# Patient Record
Sex: MALE | URBAN - METROPOLITAN AREA
[De-identification: names, ages, dates, MRNs, and addresses within clinical notes are randomized per-mention and may not be internally consistent; named-entity substitution may affect disease eponyms.]

---

## 2022-09-20 ENCOUNTER — APPOINTMENT (RX ONLY)
Dept: URBAN - METROPOLITAN AREA CLINIC 48 | Facility: CLINIC | Age: 62
Setting detail: DERMATOLOGY
End: 2022-09-20

## 2022-09-20 DIAGNOSIS — L57.0 ACTINIC KERATOSIS: ICD-10-CM

## 2022-09-20 DIAGNOSIS — D22 MELANOCYTIC NEVI: ICD-10-CM

## 2022-09-20 PROBLEM — D48.5 NEOPLASM OF UNCERTAIN BEHAVIOR OF SKIN: Status: ACTIVE | Noted: 2022-09-20

## 2022-09-20 PROCEDURE — ? DEFER

## 2022-09-20 PROCEDURE — 99203 OFFICE O/P NEW LOW 30 MIN: CPT

## 2022-09-20 PROCEDURE — ? COUNSELING

## 2022-09-20 NOTE — PROCEDURE: DEFER
X Size Of Lesion In Cm (Optional): 0
Introduction Text (Please End With A Colon): Left medial chest 2.1 \\[id number];
Detail Level: Detailed
Procedure To Be Performed At Next Visit: Biopsy by shave method
Procedure To Be Performed At Next Visit: Shave Removal
Introduction Text (Please End With A Colon): Left lateral mid back 1. 4\\[id number];
Introduction Text (Please End With A Colon): Left Medial Upper back 0.6 \\[id number];
Introduction Text (Please End With A Colon): Nose 67216;
Procedure To Be Performed At Next Visit: Cryotherapy

## 2023-09-05 ENCOUNTER — TRANSFERRED RECORDS (OUTPATIENT)
Dept: HEALTH INFORMATION MANAGEMENT | Facility: CLINIC | Age: 63
End: 2023-09-05

## 2023-09-05 ENCOUNTER — MEDICAL CORRESPONDENCE (OUTPATIENT)
Dept: HEALTH INFORMATION MANAGEMENT | Facility: CLINIC | Age: 63
End: 2023-09-05

## 2023-09-06 ENCOUNTER — TRANSCRIBE ORDERS (OUTPATIENT)
Dept: OTHER | Age: 63
End: 2023-09-06

## 2023-09-06 DIAGNOSIS — R47.9 SPEECH PROBLEM: ICD-10-CM
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
## 2023-09-06 DIAGNOSIS — R41.3 MEMORY DEFICIT: ICD-10-CM

## 2023-09-06 DIAGNOSIS — R63.4 WEIGHT LOSS: ICD-10-CM

## 2023-09-06 DIAGNOSIS — R25.8 INVOLUNTARY JERKY MOVEMENTS: ICD-10-CM

## 2023-09-06 DIAGNOSIS — S06.9XAA TBI (TRAUMATIC BRAIN INJURY) (H): Primary | ICD-10-CM

## 2023-09-14 ENCOUNTER — TRANSFERRED RECORDS (OUTPATIENT)
Dept: HEALTH INFORMATION MANAGEMENT | Facility: CLINIC | Age: 63
End: 2023-09-14

## 2023-10-03 ENCOUNTER — OFFICE VISIT (OUTPATIENT)
Dept: PHYSICAL MEDICINE AND REHAB | Facility: CLINIC | Age: 63
End: 2023-10-03
Attending: FAMILY MEDICINE
Payer: COMMERCIAL

## 2023-10-03 ENCOUNTER — LAB (OUTPATIENT)
Dept: LAB | Facility: CLINIC | Age: 63
End: 2023-10-03
Payer: COMMERCIAL

## 2023-10-03 ENCOUNTER — TELEPHONE (OUTPATIENT)
Dept: PHYSICAL MEDICINE AND REHAB | Facility: CLINIC | Age: 63
End: 2023-10-03

## 2023-10-03 ENCOUNTER — TELEPHONE (OUTPATIENT)
Dept: BEHAVIORAL HEALTH | Facility: CLINIC | Age: 63
End: 2023-10-03

## 2023-10-03 DIAGNOSIS — S06.9XAA TRAUMATIC BRAIN INJURY, WITH UNKNOWN LOSS OF CONSCIOUSNESS STATUS, INITIAL ENCOUNTER (H): ICD-10-CM

## 2023-10-03 DIAGNOSIS — S06.9XAA TRAUMATIC BRAIN INJURY, WITH UNKNOWN LOSS OF CONSCIOUSNESS STATUS, INITIAL ENCOUNTER (H): Primary | ICD-10-CM

## 2023-10-03 LAB
ALBUMIN SERPL BCG-MCNC: 5.2 G/DL (ref 3.5–5.2)
T4 FREE SERPL-MCNC: 1.16 NG/DL (ref 0.9–1.7)
TSH SERPL DL<=0.005 MIU/L-ACNC: 0.65 UIU/ML (ref 0.3–4.2)

## 2023-10-03 PROCEDURE — 84480 ASSAY TRIIODOTHYRONINE (T3): CPT

## 2023-10-03 PROCEDURE — 84439 ASSAY OF FREE THYROXINE: CPT

## 2023-10-03 PROCEDURE — 83003 ASSAY GROWTH HORMONE (HGH): CPT

## 2023-10-03 PROCEDURE — 84270 ASSAY OF SEX HORMONE GLOBUL: CPT

## 2023-10-03 PROCEDURE — 82040 ASSAY OF SERUM ALBUMIN: CPT

## 2023-10-03 PROCEDURE — 36415 COLL VENOUS BLD VENIPUNCTURE: CPT

## 2023-10-03 PROCEDURE — 84403 ASSAY OF TOTAL TESTOSTERONE: CPT

## 2023-10-03 PROCEDURE — 84443 ASSAY THYROID STIM HORMONE: CPT

## 2023-10-03 PROCEDURE — 82024 ASSAY OF ACTH: CPT

## 2023-10-03 PROCEDURE — 99205 OFFICE O/P NEW HI 60 MIN: CPT | Performed by: PHYSICAL MEDICINE & REHABILITATION

## 2023-10-03 PROCEDURE — 84146 ASSAY OF PROLACTIN: CPT

## 2023-10-03 ASSESSMENT — PATIENT HEALTH QUESTIONNAIRE - PHQ9: SUM OF ALL RESPONSES TO PHQ QUESTIONS 1-9: 11
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
## 2023-10-03 NOTE — NURSING NOTE
Depression Response    Patient completed the PHQ-9 assessment for depression and scored >9? Yes  Question 9 on the PHQ-9 was positive for suicidality? Yes  Does patient have current mental health provider? No    Is this a virtual visit? No    I personally notified the following: visit provider          Chief Complaint   Patient presents with    Consult For     Concussion

## 2023-10-03 NOTE — TELEPHONE ENCOUNTER
Per provider request transition clinic referral placed for patient. Patient was in agreement with referral.     See provider note for details.     Jonatan Rinaldi, LEONARDA ATC on 10/3/2023 at 4:25 PM

## 2023-10-03 NOTE — TELEPHONE ENCOUNTER
Writer spoke with pt and scheduled initial TC therapy appointment on 10/04/2023 @ 9:30 am. Writer sent [employer] activation link to set up my chart. Writer will reply to referral source.Tracker completed.    Juana Hollis  10/03/2023  510
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
## 2023-10-04 ENCOUNTER — VIRTUAL VISIT (OUTPATIENT)
Dept: BEHAVIORAL HEALTH | Facility: CLINIC | Age: 63
End: 2023-10-04

## 2023-10-04 DIAGNOSIS — S09.90XA DEPRESSION DUE TO HEAD INJURY: Primary | ICD-10-CM

## 2023-10-04 DIAGNOSIS — F41.1 GAD (GENERALIZED ANXIETY DISORDER): ICD-10-CM

## 2023-10-04 DIAGNOSIS — F32.A DEPRESSION DUE TO HEAD INJURY: Primary | ICD-10-CM

## 2023-10-04 LAB
ACTH PLAS-MCNC: 14 PG/ML
GH SERPL-MCNC: 2.6 UG/L
PROLACTIN SERPL 3RD IS-MCNC: 12 NG/ML (ref 4–15)
SHBG SERPL-SCNC: 51 NMOL/L (ref 11–80)
T3 SERPL-MCNC: 72 NG/DL (ref 85–202)

## 2023-10-04 ASSESSMENT — ANXIETY QUESTIONNAIRES
GAD7 TOTAL SCORE: 15
7. FEELING AFRAID AS IF SOMETHING AWFUL MIGHT HAPPEN: SEVERAL DAYS
4. TROUBLE RELAXING: MORE THAN HALF THE DAYS
3. WORRYING TOO MUCH ABOUT DIFFERENT THINGS: MORE THAN HALF THE DAYS
6. BECOMING EASILY ANNOYED OR IRRITABLE: NEARLY EVERY DAY
1. FEELING NERVOUS, ANXIOUS, OR ON EDGE: NEARLY EVERY DAY
5. BEING SO RESTLESS THAT IT IS HARD TO SIT STILL: MORE THAN HALF THE DAYS
GAD7 TOTAL SCORE: 15
2. NOT BEING ABLE TO STOP OR CONTROL WORRYING: MORE THAN HALF THE DAYS

## 2023-10-04 ASSESSMENT — COLUMBIA-SUICIDE SEVERITY RATING SCALE - C-SSRS
1. SINCE LAST CONTACT, HAVE YOU WISHED YOU WERE DEAD OR WISHED YOU COULD GO TO SLEEP AND NOT WAKE UP?: NO
2. HAVE YOU ACTUALLY HAD ANY THOUGHTS OF KILLING YOURSELF?: NO
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
## 2023-10-05 LAB
TESTOST FREE SERPL-MCNC: 6.47 NG/DL
TESTOST SERPL-MCNC: 421 NG/DL (ref 240–950)

## 2023-10-17 ENCOUNTER — MYC MEDICAL ADVICE (OUTPATIENT)
Dept: PHYSICAL MEDICINE AND REHAB | Facility: CLINIC | Age: 63
End: 2023-10-17

## 2023-10-17 ENCOUNTER — TELEPHONE (OUTPATIENT)
Dept: BEHAVIORAL HEALTH | Facility: CLINIC | Age: 63
End: 2023-10-17

## 2023-10-17 NOTE — CONFIDENTIAL NOTE
Rob was scheduled with this provider today a 0930. He dis not connect via [employer]. A  link was sent via patient cell phone however he failed to connect at 0941 Call to patient cell phone listed in his file. There was no answer.   MINERVA Vines LICSW

## 2023-10-22 ENCOUNTER — HEALTH MAINTENANCE LETTER (OUTPATIENT)
Age: 63
End: 2023-10-22

## 2023-10-26 ENCOUNTER — TRANSFERRED RECORDS (OUTPATIENT)
Dept: HEALTH INFORMATION MANAGEMENT | Facility: CLINIC | Age: 63
End: 2023-10-26

## 2023-11-13 ASSESSMENT — SLEEP AND FATIGUE QUESTIONNAIRES
HOW LIKELY ARE YOU TO NOD OFF OR FALL ASLEEP WHILE SITTING INACTIVE IN A PUBLIC PLACE: SLIGHT CHANCE OF DOZING
HOW LIKELY ARE YOU TO NOD OFF OR FALL ASLEEP WHILE WATCHING TV: HIGH CHANCE OF DOZING
HOW LIKELY ARE YOU TO NOD OFF OR FALL ASLEEP WHEN YOU ARE A PASSENGER IN A CAR FOR AN HOUR WITHOUT A BREAK: MODERATE CHANCE OF DOZING
HOW LIKELY ARE YOU TO NOD OFF OR FALL ASLEEP WHILE SITTING AND TALKING TO SOMEONE: SLIGHT CHANCE OF DOZING
HOW LIKELY ARE YOU TO NOD OFF OR FALL ASLEEP WHILE SITTING AND READING: MODERATE CHANCE OF DOZING
HOW LIKELY ARE YOU TO NOD OFF OR FALL ASLEEP WHILE LYING DOWN TO REST IN THE AFTERNOON WHEN CIRCUMSTANCES PERMIT: HIGH CHANCE OF DOZING
HOW LIKELY ARE YOU TO NOD OFF OR FALL ASLEEP WHILE SITTING QUIETLY AFTER LUNCH WITHOUT ALCOHOL: MODERATE CHANCE OF DOZING
HOW LIKELY ARE YOU TO NOD OFF OR FALL ASLEEP IN A CAR, WHILE STOPPED FOR A FEW MINUTES IN TRAFFIC: WOULD NEVER DOZE

## 2023-11-14 ENCOUNTER — OFFICE VISIT (OUTPATIENT)
Dept: SLEEP MEDICINE | Facility: CLINIC | Age: 63
End: 2023-11-14
Attending: PHYSICAL MEDICINE & REHABILITATION
Payer: COMMERCIAL

## 2023-11-14 VITALS
DIASTOLIC BLOOD PRESSURE: 71 MMHG | WEIGHT: 157.2 LBS | BODY MASS INDEX: 23.28 KG/M2 | HEART RATE: 79 BPM | SYSTOLIC BLOOD PRESSURE: 108 MMHG | HEIGHT: 69 IN | OXYGEN SATURATION: 99 % | RESPIRATION RATE: 20 BRPM

## 2023-11-14 DIAGNOSIS — R06.83 SNORING: ICD-10-CM

## 2023-11-14 DIAGNOSIS — G47.59 OTHER PARASOMNIA: ICD-10-CM

## 2023-11-14 DIAGNOSIS — G47.00 PERSISTENT INSOMNIA: ICD-10-CM

## 2023-11-14 DIAGNOSIS — S06.9XAA TRAUMATIC BRAIN INJURY, WITH UNKNOWN LOSS OF CONSCIOUSNESS STATUS, INITIAL ENCOUNTER (H): ICD-10-CM

## 2023-11-14 DIAGNOSIS — G47.8 NON-RESTORATIVE SLEEP: Primary | ICD-10-CM

## 2023-11-14 PROBLEM — R47.9 SPEECH PROBLEM: Status: ACTIVE | Noted: 2023-09-18

## 2023-11-14 PROBLEM — R47.01 EXPRESSIVE APHASIA: Status: ACTIVE | Noted: 2023-10-26

## 2023-11-14 PROBLEM — R53.83 FATIGUE: Status: ACTIVE | Noted: 2023-09-18

## 2023-11-14 PROBLEM — Z87.820 HX OF TRAUMATIC BRAIN INJURY: Status: ACTIVE | Noted: 2023-11-14

## 2023-11-14 PROBLEM — R41.3 MEMORY IMPAIRMENT: Status: ACTIVE | Noted: 2023-09-18

## 2023-11-14 PROCEDURE — 99204 OFFICE O/P NEW MOD 45 MIN: CPT | Performed by: INTERNAL MEDICINE

## 2023-11-14 RX ORDER — DIAZEPAM 10 MG
10 TABLET ORAL
COMMUNITY
Start: 2023-09-05 | End: 2023-11-29

## 2023-11-14 RX ORDER — ESCITALOPRAM OXALATE 5 MG/1
5 TABLET ORAL
COMMUNITY
Start: 2023-10-31 | End: 2023-11-29

## 2023-11-14 NOTE — NURSING NOTE
"Chief Complaint   Patient presents with    Consult For     Traumatic brain injury, with unknown loss of consciousness status,       Initial /71   Pulse 79   Resp 20   Ht 1.753 m (5' 9\")   Wt 71.3 kg (157 lb 3.2 oz)   SpO2 99%   BMI 23.21 kg/m   Estimated body mass index is 23.21 kg/m  as calculated from the following:    Height as of this encounter: 1.753 m (5' 9\").    Weight as of this encounter: 71.3 kg (157 lb 3.2 oz).    Medication Reconciliation: complete    Neck circumference:  inches / 38 centimeters.    DME:       Zander Pacheco MA  [facility]    "
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
## 2023-11-14 NOTE — PATIENT INSTRUCTIONS
Patient education: What is a sleep study?     What is a sleep study? -- A sleep study is a test that measures how well you sleep and checks for sleep problems. For some sleep studies, you stay overnight in a sleep lab at a hospital or sleep center.     What happens during a sleep study? -- Before you go to sleep, a technician attaches small, sticky patches called  electrodes  to your head, chest, and legs. He or she will also place a small tube beneath your nose and might wrap 1 or 2 belts around your chest.   Each of these items has wires that connect to monitors. The monitors record your movement, brain activity, breathing, and other body functions while you sleep.  If you have a history of trouble falling asleep, your doctor might prescribe a medicine to help you fall asleep in the lab. If you have never taken the medicine before, your doctor might ask you take it on a night before your sleep study to see how it affects you.   Why might my doctor order a sleep study? -- Your doctor will order a sleep study if he or she thinks you have sleep apnea or a different condition that makes you:   ?Have sudden jerking leg movements while you sleep, called  periodic limb movements.    ?Feel very sleepy during the day and fall asleep all of a sudden, called  narcolepsy.    ?Have trouble falling asleep or staying asleep over a long period of time, called  chronic insomnia.    ?Do odd things while you sleep, such as walking.  How should I prepare for a sleep study? -- On the day of your sleep study, you should:   ?Avoid alcohol   ?Avoid drinking coffee, tea, sodas, and other drinks that have caffeine in the afternoon and evening   ?Take all of your regular medicines     The cost of care estimate line is [phone]. They are able to give the patient an estimate of the charges and also an estimate of their insurance coverage/patient responsibility.   After your sleep study is performed, please call us at [phone] or  [phone]  to schedule for a follow up to review the results of the sleep study.    Please bring one tab of low dose melatonin 3 mg or less to the night of the study.    Melatonin intake is completely voluntary.    You may take own melatonin after arrival to sleep center. Do not drive or operate machinery after intake of melatonin.     Please make every effort you can to sleep on your back with one pillow behind your head.    Please also call your insurance company next week to see if your sleep study is approved and find out your co-pay.

## 2023-11-29 ENCOUNTER — OFFICE VISIT (OUTPATIENT)
Dept: PHYSICAL MEDICINE AND REHAB | Facility: CLINIC | Age: 63
End: 2023-11-29
Payer: COMMERCIAL

## 2023-11-29 ENCOUNTER — APPOINTMENT (OUTPATIENT)
Dept: MRI IMAGING | Facility: HOSPITAL | Age: 63
End: 2023-11-29
Attending: STUDENT IN AN ORGANIZED HEALTH CARE EDUCATION/TRAINING PROGRAM
Payer: COMMERCIAL

## 2023-11-29 ENCOUNTER — HOSPITAL ENCOUNTER (OUTPATIENT)
Facility: HOSPITAL | Age: 63
Setting detail: OBSERVATION
Discharge: HOME | End: 2023-12-01
Attending: STUDENT IN AN ORGANIZED HEALTH CARE EDUCATION/TRAINING PROGRAM | Admitting: PSYCHIATRY & NEUROLOGY
Payer: COMMERCIAL

## 2023-11-29 ENCOUNTER — APPOINTMENT (OUTPATIENT)
Dept: CT IMAGING | Facility: HOSPITAL | Age: 63
End: 2023-11-29
Attending: STUDENT IN AN ORGANIZED HEALTH CARE EDUCATION/TRAINING PROGRAM
Payer: COMMERCIAL

## 2023-11-29 ENCOUNTER — HOSPITAL ENCOUNTER (EMERGENCY)
Facility: CLINIC | Age: 63
End: 2023-11-29
Payer: COMMERCIAL

## 2023-11-29 ENCOUNTER — APPOINTMENT (OUTPATIENT)
Dept: RADIOLOGY | Facility: HOSPITAL | Age: 63
End: 2023-11-29
Attending: STUDENT IN AN ORGANIZED HEALTH CARE EDUCATION/TRAINING PROGRAM
Payer: COMMERCIAL

## 2023-11-29 VITALS — HEART RATE: 68 BPM | SYSTOLIC BLOOD PRESSURE: 119 MMHG | DIASTOLIC BLOOD PRESSURE: 73 MMHG

## 2023-11-29 DIAGNOSIS — S06.9XAD BRAIN INJURY, WITH UNKNOWN LOSS OF CONSCIOUSNESS STATUS, SUBSEQUENT ENCOUNTER: Primary | ICD-10-CM

## 2023-11-29 DIAGNOSIS — R41.89 ACUTE COGNITIVE DECLINE: Primary | ICD-10-CM

## 2023-11-29 DIAGNOSIS — R44.3 HALLUCINATIONS: ICD-10-CM

## 2023-11-29 DIAGNOSIS — R41.0 DELIRIUM: ICD-10-CM

## 2023-11-29 LAB
ALBUMIN SERPL BCG-MCNC: 4.8 G/DL (ref 3.5–5.2)
ALBUMIN UR-MCNC: NEGATIVE MG/DL
ALP SERPL-CCNC: 123 U/L (ref 40–150)
ALT SERPL W P-5'-P-CCNC: 47 U/L (ref 0–70)
AMMONIA PLAS-SCNC: 17 UMOL/L (ref 16–60)
AMPHETAMINES UR QL SCN: NORMAL
ANION GAP SERPL CALCULATED.3IONS-SCNC: 8 MMOL/L (ref 7–15)
APPEARANCE UR: CLEAR
AST SERPL W P-5'-P-CCNC: 26 U/L (ref 0–45)
BARBITURATES UR QL SCN: NORMAL
BASOPHILS # BLD AUTO: 0 10E3/UL (ref 0–0.2)
BASOPHILS NFR BLD AUTO: 1 %
BENZODIAZ UR QL SCN: NORMAL
BILIRUB DIRECT SERPL-MCNC: <0.2 MG/DL (ref 0–0.3)
BILIRUB SERPL-MCNC: 0.3 MG/DL
BILIRUB UR QL STRIP: NEGATIVE
BUN SERPL-MCNC: 24.7 MG/DL (ref 8–23)
BZE UR QL SCN: NORMAL
CALCIUM SERPL-MCNC: 9.3 MG/DL (ref 8.8–10.2)
CANNABINOIDS UR QL SCN: NORMAL
CHLORIDE SERPL-SCNC: 105 MMOL/L (ref 98–107)
COLOR UR AUTO: NORMAL
CREAT SERPL-MCNC: 0.98 MG/DL (ref 0.67–1.17)
DEPRECATED HCO3 PLAS-SCNC: 28 MMOL/L (ref 22–29)
EGFRCR SERPLBLD CKD-EPI 2021: 87 ML/MIN/1.73M2
EOSINOPHIL # BLD AUTO: 0.1 10E3/UL (ref 0–0.7)
EOSINOPHIL NFR BLD AUTO: 2 %
ERYTHROCYTE [DISTWIDTH] IN BLOOD BY AUTOMATED COUNT: 13 % (ref 10–15)
FENTANYL UR QL: NORMAL
GLUCOSE SERPL-MCNC: 98 MG/DL (ref 70–99)
GLUCOSE UR STRIP-MCNC: NEGATIVE MG/DL
HCT VFR BLD AUTO: 41.5 % (ref 40–53)
HGB BLD-MCNC: 13.3 G/DL (ref 13.3–17.7)
HGB UR QL STRIP: NEGATIVE
IMM GRANULOCYTES # BLD: 0 10E3/UL
IMM GRANULOCYTES NFR BLD: 1 %
KETONES UR STRIP-MCNC: NEGATIVE MG/DL
LEUKOCYTE ESTERASE UR QL STRIP: NEGATIVE
LIPASE SERPL-CCNC: 26 U/L (ref 13–60)
LYMPHOCYTES # BLD AUTO: 1.4 10E3/UL (ref 0.8–5.3)
LYMPHOCYTES NFR BLD AUTO: 17 %
MAGNESIUM SERPL-MCNC: 2 MG/DL (ref 1.7–2.3)
MCH RBC QN AUTO: 28.5 PG (ref 26.5–33)
MCHC RBC AUTO-ENTMCNC: 32 G/DL (ref 31.5–36.5)
MCV RBC AUTO: 89 FL (ref 78–100)
MONOCYTES # BLD AUTO: 0.5 10E3/UL (ref 0–1.3)
MONOCYTES NFR BLD AUTO: 6 %
NEUTROPHILS # BLD AUTO: 6.2 10E3/UL (ref 1.6–8.3)
NEUTROPHILS NFR BLD AUTO: 73 %
NITRATE UR QL: NEGATIVE
NRBC # BLD AUTO: 0 10E3/UL
NRBC BLD AUTO-RTO: 0 /100
OPIATES UR QL SCN: NORMAL
PCP QUAL URINE (ROCHE): NORMAL
PH UR STRIP: 6.5 [PH] (ref 5–7)
PHOSPHATE SERPL-MCNC: 3.6 MG/DL (ref 2.5–4.5)
PLATELET # BLD AUTO: 263 10E3/UL (ref 150–450)
POTASSIUM SERPL-SCNC: 4.3 MMOL/L (ref 3.4–5.3)
PROT SERPL-MCNC: 7 G/DL (ref 6.4–8.3)
RBC # BLD AUTO: 4.66 10E6/UL (ref 4.4–5.9)
RBC URINE: 1 /HPF
SODIUM SERPL-SCNC: 141 MMOL/L (ref 135–145)
SP GR UR STRIP: 1.02 (ref 1–1.03)
UROBILINOGEN UR STRIP-MCNC: <2 MG/DL
WBC # BLD AUTO: 8.3 10E3/UL (ref 4–11)
WBC URINE: <1 /HPF

## 2023-11-29 PROCEDURE — 83735 ASSAY OF MAGNESIUM: CPT | Performed by: INTERNAL MEDICINE

## 2023-11-29 PROCEDURE — 255N000002 HC RX 255 OP 636: Mod: JZ | Performed by: STUDENT IN AN ORGANIZED HEALTH CARE EDUCATION/TRAINING PROGRAM

## 2023-11-29 PROCEDURE — A9585 GADOBUTROL INJECTION: HCPCS | Mod: JZ | Performed by: STUDENT IN AN ORGANIZED HEALTH CARE EDUCATION/TRAINING PROGRAM

## 2023-11-29 PROCEDURE — 99285 EMERGENCY DEPT VISIT HI MDM: CPT | Mod: 25

## 2023-11-29 PROCEDURE — 82248 BILIRUBIN DIRECT: CPT | Performed by: STUDENT IN AN ORGANIZED HEALTH CARE EDUCATION/TRAINING PROGRAM

## 2023-11-29 PROCEDURE — 85025 COMPLETE CBC W/AUTO DIFF WBC: CPT | Performed by: STUDENT IN AN ORGANIZED HEALTH CARE EDUCATION/TRAINING PROGRAM

## 2023-11-29 PROCEDURE — 84100 ASSAY OF PHOSPHORUS: CPT | Performed by: INTERNAL MEDICINE

## 2023-11-29 PROCEDURE — G0378 HOSPITAL OBSERVATION PER HR: HCPCS

## 2023-11-29 PROCEDURE — 250N000013 HC RX MED GY IP 250 OP 250 PS 637: Performed by: STUDENT IN AN ORGANIZED HEALTH CARE EDUCATION/TRAINING PROGRAM

## 2023-11-29 PROCEDURE — 36415 COLL VENOUS BLD VENIPUNCTURE: CPT | Performed by: STUDENT IN AN ORGANIZED HEALTH CARE EDUCATION/TRAINING PROGRAM

## 2023-11-29 PROCEDURE — 82607 VITAMIN B-12: CPT | Performed by: INTERNAL MEDICINE

## 2023-11-29 PROCEDURE — 80053 COMPREHEN METABOLIC PANEL: CPT | Performed by: STUDENT IN AN ORGANIZED HEALTH CARE EDUCATION/TRAINING PROGRAM

## 2023-11-29 PROCEDURE — 70553 MRI BRAIN STEM W/O & W/DYE: CPT

## 2023-11-29 PROCEDURE — 83690 ASSAY OF LIPASE: CPT | Performed by: STUDENT IN AN ORGANIZED HEALTH CARE EDUCATION/TRAINING PROGRAM

## 2023-11-29 PROCEDURE — 82140 ASSAY OF AMMONIA: CPT | Performed by: STUDENT IN AN ORGANIZED HEALTH CARE EDUCATION/TRAINING PROGRAM

## 2023-11-29 PROCEDURE — 70450 CT HEAD/BRAIN W/O DYE: CPT

## 2023-11-29 PROCEDURE — 80307 DRUG TEST PRSMV CHEM ANLYZR: CPT | Performed by: INTERNAL MEDICINE

## 2023-11-29 PROCEDURE — 99215 OFFICE O/P EST HI 40 MIN: CPT | Performed by: PHYSICAL MEDICINE & REHABILITATION

## 2023-11-29 PROCEDURE — 71046 X-RAY EXAM CHEST 2 VIEWS: CPT

## 2023-11-29 PROCEDURE — 81001 URINALYSIS AUTO W/SCOPE: CPT | Mod: XU | Performed by: STUDENT IN AN ORGANIZED HEALTH CARE EDUCATION/TRAINING PROGRAM

## 2023-11-29 PROCEDURE — 99222 1ST HOSP IP/OBS MODERATE 55: CPT | Performed by: INTERNAL MEDICINE

## 2023-11-29 PROCEDURE — 84425 ASSAY OF VITAMIN B-1: CPT | Performed by: INTERNAL MEDICINE

## 2023-11-29 RX ORDER — ONDANSETRON 4 MG/1
4 TABLET, ORALLY DISINTEGRATING ORAL EVERY 6 HOURS PRN
Status: DISCONTINUED | OUTPATIENT
Start: 2023-11-29 | End: 2023-12-01 | Stop reason: HOSPADM

## 2023-11-29 RX ORDER — AMOXICILLIN 250 MG
2 CAPSULE ORAL 2 TIMES DAILY PRN
Status: DISCONTINUED | OUTPATIENT
Start: 2023-11-29 | End: 2023-12-01 | Stop reason: HOSPADM

## 2023-11-29 RX ORDER — ACETAMINOPHEN 650 MG/1
650 SUPPOSITORY RECTAL EVERY 4 HOURS PRN
Status: DISCONTINUED | OUTPATIENT
Start: 2023-11-29 | End: 2023-12-01 | Stop reason: HOSPADM

## 2023-11-29 RX ORDER — ACETAMINOPHEN 325 MG/1
650 TABLET ORAL EVERY 4 HOURS PRN
Status: DISCONTINUED | OUTPATIENT
Start: 2023-11-29 | End: 2023-12-01 | Stop reason: HOSPADM

## 2023-11-29 RX ORDER — ONDANSETRON 2 MG/ML
4 INJECTION INTRAMUSCULAR; INTRAVENOUS EVERY 6 HOURS PRN
Status: DISCONTINUED | OUTPATIENT
Start: 2023-11-29 | End: 2023-12-01 | Stop reason: HOSPADM

## 2023-11-29 RX ORDER — GADOBUTROL 604.72 MG/ML
7 INJECTION INTRAVENOUS ONCE
Status: COMPLETED | OUTPATIENT
Start: 2023-11-29 | End: 2023-11-29

## 2023-11-29 RX ORDER — POLYETHYLENE GLYCOL 3350 17 G/17G
17 POWDER, FOR SOLUTION ORAL 2 TIMES DAILY PRN
Status: DISCONTINUED | OUTPATIENT
Start: 2023-11-29 | End: 2023-12-01 | Stop reason: HOSPADM

## 2023-11-29 RX ORDER — CALCIUM CARBONATE 500 MG/1
1000 TABLET, CHEWABLE ORAL 4 TIMES DAILY PRN
Status: DISCONTINUED | OUTPATIENT
Start: 2023-11-29 | End: 2023-12-01 | Stop reason: HOSPADM

## 2023-11-29 RX ORDER — DIAZEPAM 5 MG
5 TABLET ORAL ONCE
Status: COMPLETED | OUTPATIENT
Start: 2023-11-29 | End: 2023-11-29

## 2023-11-29 RX ORDER — OLANZAPINE 10 MG/2ML
5 INJECTION, POWDER, FOR SOLUTION INTRAMUSCULAR EVERY 8 HOURS PRN
Status: DISCONTINUED | OUTPATIENT
Start: 2023-11-29 | End: 2023-12-01

## 2023-11-29 RX ORDER — AMOXICILLIN 250 MG
1 CAPSULE ORAL 2 TIMES DAILY PRN
Status: DISCONTINUED | OUTPATIENT
Start: 2023-11-29 | End: 2023-12-01 | Stop reason: HOSPADM

## 2023-11-29 RX ADMIN — DIAZEPAM 5 MG: 5 TABLET ORAL at 16:39

## 2023-11-29 RX ADMIN — QUETIAPINE FUMARATE 50 MG: 25 TABLET, FILM COATED ORAL at 20:34

## 2023-11-29 RX ADMIN — GADOBUTROL 7 ML: 604.72 INJECTION INTRAVENOUS at 17:17

## 2023-11-29 ASSESSMENT — ACTIVITIES OF DAILY LIVING (ADL)
ADLS_ACUITY_SCORE: 35

## 2023-11-29 NOTE — ED NOTES
Expected Patient Referral to ED  12:58 PM    Referring Clinic/Provider:  Neurology clinic    Reason for referral/Clinical facts:  63 M with TBI chronically, seen in clinic for follow-up and concern for possible seizures, hallucinations. ? Safety concerns.    Recommendations provided:  Send to ED for further evaluation    Caller was informed that this institution does not possess the capabilities and/or resources to provide for patient and should be transferred to a different facility due to lack of neurology .    Discussed that if direct admit is sought and any hurdles are encountered, this ED would be happy to see the patient and evaluate.    Informed caller that recommendations provided are recommendations based only on the facts provided and that they responsible to accept or reject the advice, or to seek a formal in person consultation as needed and that this ED will see/treat patient should they arrive.      Tonya Andrade MD  [facility] EMERGENCY ROOM  [address] [facility] [address] [facility] [address]  [phone]       Tonya Andrade MD  11/29/23 3974

## 2023-11-29 NOTE — LETTER
"    11/29/2023         RE: Rob Valderrama  [address] [place] [address] [place] [address]        Dear Colleague,    Thank you for referring your patient, Rob Valderrama, to the [facility]. Please see a copy of my visit note below.    .[facility]   PM&R clinic note        Interval history:     Rob Valderrama presents to clinic today for follow up reg his rehab needs.   He has h/o TBI in 2021 that resulted in mood and behavioral issues. Seen by Dr. Burns in Sep.23 and a referral to TBI specialist was initiated along with SLP and neuropsychology.     Was last seen in clinic 10/3/23  Recommendations included MRI brain. Neuroendocrine workup, continue SLP, await neuropsychology. A referral to neurology to evaluate for dementia. A referral to sleep medicine was initiated to explore sleep related pathology.  services is arranged by x-wife. Follow up: 3 months     Patient present today with. C/O the following:   Patient is still awaiting for neuropsychology testing and still awaiting sleep study.    Medical issues since last visit:     Ex-wife reports that patient is declining and there has been safety concerns. For the last couple of nights, weird dreams, hallucinations. Ex-wife thinks that he has seizures but not sure  Patient has constant thoughts that he is in danger and anxious about that and not sure what to do.     Social history is unchanged,     Medications:  Current Outpatient Medications   Medication Sig Dispense Refill     diazepam (VALIUM) 10 MG tablet Take 10 mg by mouth       escitalopram (LEXAPRO) 5 MG tablet Take 5 mg by mouth            Physical Exam:   There were no vitals taken for this visit.  Gen: NAD, pleasant and cooperative     Labs/Imaging:  No results found for: \"WBC\", \"HGB\", \"HCT\", \"MCV\", \"PLT\"  No results found for: \"NA\", \"POTASSIUM\", \"CHLORIDE\", \"CO2\", \"GLC\"  No results found for: \"GFRESTIMATED\", \"GFRESTBLACK\"  No results found " "for: \"AST\", \"ALT\", \"GGT\", \"ALKPHOS\", \"BILITOTAL\", \"BILICONJ\", \"BILIDIRECT\", \"TANJA\"  No results found for: \"INR\"  No results found for: \"BUN\", \"CR\"           Assessment/Plan     .(S06.9XAD) Brain injury, with unknown loss of consciousness status, subsequent encounter  (primary encounter diagnosis)      At this time given the patient's mental status and cognitive deterioration, there is a risk of having seizures. Furthermore, his hallucinations and the fact that he feels threatened can impose a safety risk. I contacted ED at [facility] and they advised me to send the patient to [facility] to consider Neurology and psychiatry evaluation and possible admission for workup. Patient and ex-wife voiced understanding.    Follow up: after neuropsychology.      Seble Heredia MD  Physical Medicine & Rehabilitation      65 minutes spent on the date of the encounter doing chart review, history and exam, documentation and further activities as noted above          Again, thank you for allowing me to participate in the care of your patient.        Sincerely,        Seble Heredia MD  "

## 2023-11-29 NOTE — PROGRESS NOTES
".[facility]   PM&R clinic note        Interval history:     Rob Valderrama presents to clinic today for follow up reg his rehab needs.   He has h/o TBI in 2021 that resulted in mood and behavioral issues. Seen by Dr. Burns in Sep.23 and a referral to TBI specialist was initiated along with SLP and neuropsychology.     Was last seen in clinic 10/3/23  Recommendations included MRI brain. Neuroendocrine workup, continue SLP, await neuropsychology. A referral to neurology to evaluate for dementia. A referral to sleep medicine was initiated to explore sleep related pathology.  services is arranged by x-wife. Follow up: 3 months     Patient present today with. C/O the following:   Patient is still awaiting for neuropsychology testing and still awaiting sleep study.    Medical issues since last visit:     Ex-wife reports that patient is declining and there has been safety concerns. For the last couple of nights, weird dreams, hallucinations. Ex-wife thinks that he has seizures but not sure  Patient has constant thoughts that he is in danger and anxious about that and not sure what to do.     Social history is unchanged,     Medications:  Current Outpatient Medications   Medication Sig Dispense Refill    diazepam (VALIUM) 10 MG tablet Take 10 mg by mouth      escitalopram (LEXAPRO) 5 MG tablet Take 5 mg by mouth            Physical Exam:   There were no vitals taken for this visit.  Gen: NAD, pleasant and cooperative     Labs/Imaging:  No results found for: \"WBC\", \"HGB\", \"HCT\", \"MCV\", \"PLT\"  No results found for: \"NA\", \"POTASSIUM\", \"CHLORIDE\", \"CO2\", \"GLC\"  No results found for: \"GFRESTIMATED\", \"GFRESTBLACK\"  No results found for: \"AST\", \"ALT\", \"GGT\", \"ALKPHOS\", \"BILITOTAL\", \"BILICONJ\", \"BILIDIRECT\", \"TANJA\"  No results found for: \"INR\"  No results found for: \"BUN\", \"CR\"           Assessment/Plan     .(S06.9XAD) Brain injury, with unknown loss of consciousness status, subsequent " encounter  (primary encounter diagnosis)      At this time given the patient's mental status and cognitive deterioration, there is a risk of having seizures. Furthermore, his hallucinations and the fact that he feels threatened can impose a safety risk. I contacted ED at [facility] and they advised me to send the patient to [facility] to consider Neurology and psychiatry evaluation and possible admission for workup. Patient and ex-wife voiced understanding.    Follow up: after neuropsychology.      Seble Heredia MD  Physical Medicine & Rehabilitation      65 minutes spent on the date of the encounter doing chart review, history and exam, documentation and further activities as noted above

## 2023-11-29 NOTE — ED TRIAGE NOTES
Patient presents here for evaluation of increasing memory loss, speech loss, confusion, difficulty writing. He has a history of a TBI two years ago, but symptoms have been accelerating over the past two weeks. He was sent here for neuro consult.
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
## 2023-11-30 ENCOUNTER — APPOINTMENT (OUTPATIENT)
Dept: OCCUPATIONAL THERAPY | Facility: HOSPITAL | Age: 63
End: 2023-11-30
Attending: INTERNAL MEDICINE
Payer: COMMERCIAL

## 2023-11-30 ENCOUNTER — APPOINTMENT (OUTPATIENT)
Dept: NEUROLOGY | Facility: HOSPITAL | Age: 63
End: 2023-11-30
Attending: PSYCHIATRY & NEUROLOGY
Payer: COMMERCIAL

## 2023-11-30 PROBLEM — R41.0 DELIRIUM: Status: RESOLVED | Noted: 2023-11-29 | Resolved: 2023-11-30

## 2023-11-30 PROBLEM — R41.89 ACUTE COGNITIVE DECLINE: Status: ACTIVE | Noted: 2023-09-18

## 2023-11-30 LAB — VIT B12 SERPL-MCNC: 328 PG/ML (ref 232–1245)

## 2023-11-30 PROCEDURE — 96372 THER/PROPH/DIAG INJ SC/IM: CPT | Performed by: INTERNAL MEDICINE

## 2023-11-30 PROCEDURE — 250N000011 HC RX IP 250 OP 636: Performed by: INTERNAL MEDICINE

## 2023-11-30 PROCEDURE — 99205 OFFICE O/P NEW HI 60 MIN: CPT | Performed by: PSYCHIATRY & NEUROLOGY

## 2023-11-30 PROCEDURE — G0378 HOSPITAL OBSERVATION PER HR: HCPCS

## 2023-11-30 PROCEDURE — 95816 EEG AWAKE AND DROWSY: CPT | Mod: 26 | Performed by: PSYCHIATRY & NEUROLOGY

## 2023-11-30 PROCEDURE — 95819 EEG AWAKE AND ASLEEP: CPT

## 2023-11-30 PROCEDURE — 99232 SBSQ HOSP IP/OBS MODERATE 35: CPT | Performed by: INTERNAL MEDICINE

## 2023-11-30 PROCEDURE — 97165 OT EVAL LOW COMPLEX 30 MIN: CPT | Mod: GO

## 2023-11-30 PROCEDURE — 250N000013 HC RX MED GY IP 250 OP 250 PS 637: Performed by: INTERNAL MEDICINE

## 2023-11-30 PROCEDURE — 99417 PROLNG OP E/M EACH 15 MIN: CPT | Performed by: PSYCHIATRY & NEUROLOGY

## 2023-11-30 RX ORDER — CYANOCOBALAMIN 1000 UG/ML
1000 INJECTION, SOLUTION INTRAMUSCULAR; SUBCUTANEOUS
Status: DISCONTINUED | OUTPATIENT
Start: 2023-11-30 | End: 2023-12-01 | Stop reason: HOSPADM

## 2023-11-30 RX ORDER — TRAZODONE HYDROCHLORIDE 50 MG/1
50 TABLET, FILM COATED ORAL AT BEDTIME
Status: DISCONTINUED | OUTPATIENT
Start: 2023-11-30 | End: 2023-12-01 | Stop reason: ALTCHOICE

## 2023-11-30 RX ADMIN — TRAZODONE HYDROCHLORIDE 50 MG: 50 TABLET ORAL at 20:06

## 2023-11-30 RX ADMIN — OLANZAPINE 5 MG: 10 INJECTION, POWDER, FOR SOLUTION INTRAMUSCULAR at 02:38

## 2023-11-30 RX ADMIN — CYANOCOBALAMIN 1000 MCG: 1000 INJECTION, SOLUTION INTRAMUSCULAR; SUBCUTANEOUS at 18:56

## 2023-11-30 ASSESSMENT — ACTIVITIES OF DAILY LIVING (ADL)
ADLS_ACUITY_SCORE: 35
DEPENDENT_IADLS:: TRANSPORTATION
ADLS_ACUITY_SCORE: 35

## 2023-11-30 NOTE — H&P
[facility]    History and Physical - Hospitalist Service       Date of Admission:  11/29/2023    Assessment & Plan   Principal Problem:    Delirium  Active Problems:    Hx of traumatic brain injury    Memory impairment    Speech problem    Hallucinations       Rob Valderrama is a 63 year old male with history of TBI 2 years ago and no other known chronic medical issues admitted on 11/29/2023 with rapid cognitive decline      Delirium/encephalopathy/rapid cognitive decline:  History of TBI several years ago  Presents with acute decompensation of mental status for the past several days with hallucinations, increased forgetfulness, fatigue, speech impairment.  Was found wandering outside and confused last evening.  MRI brain with mild chronic microvascular ischemic changes and mild to moderate generalized cerebral atrophy with no other acute findings.  CMP, ammonia, magnesium, phosphorus, lipase, CBC, UA unremarkable  Recent thyroid studies shows normal TSH, normal free T4 and mildly low T3 that is likely not contributing  No history of alcohol or drug abuse per patient's ex-wife and per the patient  Chest x-ray clear  -- Check urine drug screen for completeness  -- Neurology recommended one-time dose of Seroquel 50 mg  -- Neurology consult for the a.m.  -- OT consult for cognitive eval  -- As needed Seroquel and Zyprexa to manage behaviors overnight           Diet: Regular Diet Adult    DVT Prophylaxis: Low Risk/Ambulatory with no VTE prophylaxis indicated  Corbin Catheter: Not present  Lines: None     Cardiac Monitoring: None  Code Status: Full Code      Clinically Significant Risk Factors Present on Admission                                  Disposition Plan      Expected Discharge Date: 11/30/2023                  Allan Perez, DO  Hospitalist Service  [facility]  Securely message with RMI (more info)  Text page via PopJam Paging/Twengay      ______________________________________________________________________    Chief Complaint   Increased confusion    History is obtained from the patient and the patient's caretaker/ex-wife    History of Present Illness   Rob Valderrama is a 63 year old male who presents with rapid cognitive decline over the past several days with confusion, hallucinations, forgetfulness, speech impairment and paranoia.  Currently the patient denies any complaints other than feeling confused.      Past Medical History    History reviewed. No pertinent past medical history.    Past Surgical History   History reviewed. No pertinent surgical history.    Prior to Admission Medications   None           Physical Exam   Vital Signs: Temp: 97.7  F (36.5  C) Temp src: Oral BP: 106/57 Pulse: 76   Resp: 20 SpO2: 100 % O2 Device: None (Room air)    Weight: 153 lbs 10.57 oz    General Appearance: In no acute distress  EYES: Clear, without inflammation   RESPIRATORY: Respirations nonlabored  CARDIOVASCULAR: No le edema bilat.  ABDOMEN: soft and non-tender  RECTAL: deferred  GENITOURINARY: deferred  MUSCULOSKELETAL: No joint swelling, inflammation, or tenderness  NEUROLOGIC: No focal arm or leg weakness, speech is clear  PSYCHIATRIC: Oriented to self, confused about place and time and historical data    Medical Decision Making       >60 MINUTES SPENT BY ME on the date of service doing chart review, history, exam, documentation & further activities per the note.      Data

## 2023-11-30 NOTE — PHARMACY-ADMISSION MEDICATION HISTORY
Pharmacist Admission Medication History    Admission medication history is complete. The information provided in this note is only as accurate as the sources available at the time of the update.    Information Source(s): Patient, Family member, and CareEverywhere/SureScripts via in-person    Pertinent Information: No PTA medications     Changes made to PTA medication list:  Added: None  Deleted: escitalopram, diazepam   Changed: None    Allergies reviewed with patient and updates made in EHR: yes    Medication History Completed By: KJ JACOBS RPH 11/29/2023 7:48 PM    No outpatient medications have been marked as taking for the 11/29/23 encounter (Hospital Encounter).

## 2023-11-30 NOTE — PROGRESS NOTES
"   11/30/23 1000   Appointment Info   Signing Clinician's Name / Credentials (OT) Korina Ribera, OTR/L   Quick Adds   Quick Adds Certification   Living Environment   People in Home alone   Current Living Arrangements house   Home Accessibility stairs within home   Living Environment Comments Pt lives in 2-story house alone, pt unable to provide details on living environment d/t confusion. Pt's ex-wife present and states pt usually comes over 1x/wk for dinner (pt and ex-wife have children together), this week has spent the night every night this week at ex-wife's.   Self-Care   Activity/Exercise/Self-Care Comment Pt typically independent.   Instrumental Activities of Daily Living (IADL)   IADL Comments Pt has been mostly independent with IADLs, gets 1 Meals on Wheels meal per day.   General Information   Onset of Illness/Injury or Date of Surgery 11/29/23   Referring Physician Allan Perez DO   Patient/Family Therapy Goal Statement (OT) none stated   Additional Occupational Profile Info/Pertinent History of Current Problem Per chart review, pt \"is a 63 year old male with history of TBI 2 years ago and no other known chronic medical issues admitted on 11/29/2023 with rapid cognitive decline\"   Existing Precautions/Restrictions no known precautions/restrictions   Limitations/Impairments safety/cognitive  (hx TBI, new delirium)   Cognitive Status Examination   Cognitive Status Comments Alert and agreeable, difficulty with word-finding, confused.   Cognitive Screens/Assessments   Cognitive Assessments Completed (S)  SLUMS  (per MD request)   [facility] Mental Status Exam (UMS):  Total Score out of /30 (S)  9   SLUMS Norms 1-20 equals dementia   SLUMS Domains assessed: orientation, memory, attention, executive functions   SLUMS Interpretation Pt demo deficits in memory, attention, problem-solving, and orientation.   Pain Assessment   Patient Currently in Pain No   Transfers   Transfer Comments Not tested. " Per RN, pt does not have mobility deficits requiring OT evaluation at this time, primary focus of OT eval cognition.   Activities of Daily Living   BADL Assessment/Intervention   (Not tested. Per RN, pt does not have ADL deficits requiring OT evaluation at this time, primary focus of OT eval cognition.)   Clinical Impression   Criteria for Skilled Therapeutic Interventions Met (OT) Evaluation only   Clinical Decision Making Complexity (OT) problem focused assessment/low complexity   OT Total Evaluation Time   OT Eval, Low Complexity Minutes (52942) 13   Therapy Certification   Medical Diagnosis delirium   Start of Care Date 11/30/23   Certification date from 11/30/23   Certification date to 12/07/23   OT Discharge Planning   OT Plan discharge OT   OT Discharge Recommendation (DC Rec) home with assist   OT Rationale for DC Rec Patient demonstrates new cognitive decline, mobility and ADLs at baseline per RN and family. Pt requires 24/7 supervision for safety and assistance with IADLs, recommend pt discharge home with family assistance vs may need more supportive living environment pending delirium improving.   OT Brief overview of current status 9/30 on SLUMS   Total Session Time   Total Session Time (sum of timed and untimed services) 13        [facility]  OUTPATIENT OCCUPATIONAL THERAPY  EVALUATION  PLAN OF TREATMENT FOR OUTPATIENT REHABILITATION  (COMPLETE FOR INITIAL CLAIMS ONLY)  Patient's Last Name, First Name, M.I.  YOB: 1960  Rob Valderrama                          Provider's Name  [facility] Medical Record No.  [id number]                             Onset Date:  11/29/23   Start of Care Date:  11/30/23   Type:     ___PT   _X_OT   ___SLP Medical Diagnosis:  delirium                    OT Diagnosis:    Visits from SOC:  1     See note for plan of treatment, functional goals and certification details    I CERTIFY THE NEED FOR THESE  SERVICES FURNISHED UNDER        THIS PLAN OF TREATMENT AND WHILE UNDER MY CARE     (Physician co-signature of this document indicates review and certification of the therapy plan).

## 2023-11-30 NOTE — ED NOTES
Pt is a/o x2 to person and situation, pt was able to tell writer of his history of having a TBI, but has a short term memory, not able to remember writer's name despite telling him several times, VSS. Pt is pleasant, calm, and cooperative.

## 2023-11-30 NOTE — PROGRESS NOTES
[facility]    Hospitalist Progress Note    Assessment & Plan   63 year old male who was admitted on 11/29/2023 with progressive memory decline over months, and now hallucinating, and some agitation.     Impression:   Principal Problem:    Acute cognitive decline -- appears to be over months to 1-2 yrs, suspect degenerative process   -- Vit B12 borderline low at 328, will replace   -- Neurology consulted, EEG ordered    Active Problems:    Hx of traumatic brain injury 2021 -- he was in [place], wife not sure of details, but brain MRI does not show any areas of encephalomalacia       Speech problem -- suspect related to the cognitive decline       Hallucinations   -- Seroquel PRN, Trazodone 50 mg qhs      Plan:  Discussed with Ex-wife, she plans to take him to live with her on discharge.     DVT Prophylaxis: Pneumatic Compression Devices  Code Status: Full Code    Disposition: Expected discharge home with Ex-wife tomorrow, may need home services.     Zachary Mccall MD  Pager [phone]  Cell Phone [phone]  Text Page (7am to 6pm)    Interval History   Cooperative but very forgetful, has trouble following directions and some word finding difficulties.     Physical Exam   Temp: 98.4  F (36.9  C) Temp src: Oral BP: 109/64 Pulse: 54   Resp: 16 SpO2: 100 % O2 Device: None (Room air)    Vitals:    11/29/23 1343 11/29/23 2213 11/30/23 1300   Weight: 71.2 kg (157 lb) 69.7 kg (153 lb 10.6 oz) 71.5 kg (157 lb 10.1 oz)     Vital Signs with Ranges  Temp:  [97.7  F (36.5  C)-98.4  F (36.9  C)] 98.4  F (36.9  C)  Pulse:  [54-76] 54  Resp:  [16-20] 16  BP: (106-118)/(57-74) 109/64  SpO2:  [100 %] 100 %  I/O last 3 completed shifts:  In: 360 [P.O.:360]  Out: -     # Pain Assessment:      11/30/2023     4:16 PM   Current Pain Score   Patient currently in pain? denies   Rob s pain level was assessed and he currently denies pain.        Constitutional: Awake, alert, cooperative, no apparent  "distress  Respiratory: Clear to auscultation bilaterally, no crackles or wheezing  Cardiovascular: Regular rate and rhythm, normal S1 and S2, and no murmur noted  GI: Normal bowel sounds, soft, non-distended, non-tender  Extrem: No calf tenderness, no ankle edema  Neuro: Ox1 (did not know where he was, or the month (guessed September) or the year, and did not know who the president was but guessed \"Biden\" when given the clue \"Luis\"), no focal motor or sensory deficits, but slow with physical tasks such as squeezing fingers.     Medications      cyanocobalamin  1,000 mcg Intramuscular Q30 Days    traZODone  50 mg Oral At Bedtime       Data   Recent Labs   Lab 11/29/23  1600   WBC 8.3   HGB 13.3   MCV 89         POTASSIUM 4.3   CHLORIDE 105   CO2 28   BUN 24.7*   CR 0.98   ANIONGAP 8   MICHELLE 9.3   GLC 98   ALBUMIN 4.8   PROTTOTAL 7.0   BILITOTAL 0.3   ALKPHOS 123   ALT 47   AST 26   LIPASE 26       Imaging:   No results found for this or any previous visit (from the past 24 hour(s)).     "

## 2023-11-30 NOTE — CONSULTS
Care Management Initial Consult    General Information  Assessment completed with: Patient, Other, ex-spouse Soha  Type of CM/SW Visit: Initial Assessment    Primary Care Provider verified and updated as needed: Yes   Readmission within the last 30 days: no previous admission in last 30 days         Advance Care Planning: Advance Care Planning Reviewed:  (no HCD, stating Soha would make decisions if needed)          Communication Assessment  Patient's communication style: spoken language (English or Bilingual)             Cognitive  Cognitive/Neuro/Behavioral: .WDL except, orientation  Level of Consciousness: alert, confused  Arousal Level: opens eyes spontaneously  Orientation: disoriented to, place, time  Mood/Behavior: calm, cooperative  Best Language: 0 - No aphasia  Speech: clear, logical, other (see comments) (Trouble finding correct words, some of the time)    Living Environment:   People in home: alone     Current living Arrangements: house      Able to return to prior arrangements: yes       Family/Social Support:  Care provided by: self  Provides care for: no one  Marital Status:    (ex-spouse)          Description of Support System: Supportive, Involved         Current Resources:   Patient receiving home care services: No     Community Resources: Meals on Wheels  Equipment currently used at home:  none  Supplies currently used at home: None    Employment/Financial:  Employment Status: unemployed        Does the patient's insurance plan have a 3 day qualifying hospital stay waiver?  No    Lifestyle & Psychosocial Needs:  Social Determinants of Health     Food Insecurity: Not on file   Depression: At risk (10/3/2023)    PHQ-2     PHQ-2 Score: 5   Housing Stability: Not on file   Tobacco Use: Low Risk  (11/29/2023)    Patient History     Smoking Tobacco Use: Never     Smokeless Tobacco Use: Never     Passive Exposure: Not on file   Financial Resource Strain: Not on file   Alcohol Use: Not on  file   Transportation Needs: Not on file   Physical Activity: Not on file   Interpersonal Safety: Not on file   Stress: Not on file   Social Connections: Not on file       Functional Status:  Prior to admission patient needed assistance:   Dependent ADLs:: Independent  Dependent IADLs:: Transportation           Additional Information:    Assessment completed with patient and ex-spouse Soha.  Patient lives alone in his house in [place]. He is independent with ADLs and most IADLs (does not drive) and ambulates without devices. He stays with his ex-spouse on and off. Currently only services is meals on wheels. Primary family contact will be Soha.     Final discharge needs pending progression and recommendations.        Radha Bojorquez RN

## 2023-11-30 NOTE — ED NOTES
[facility] ED Handoff Report    ED Chief Complaint: AMS    ED Diagnosis:  (R41.0) Delirium  Comment:   Plan: Neuro consult in am    (R44.3) Hallucinations  Comment:   Plan:        PMH:  History reviewed. No pertinent past medical history.     Code Status:  Full Code     Falls Risk: Yes Band: Applied    Current Living Situation/Residence: lives with a significant other     Elimination Status: Continent: Yes     Activity Level: independent    Patients Preferred Language:  English     Needed: No    Vital Signs:  /74   Pulse 60   Temp 98.2  F (36.8  C) (Oral)   Resp 19   Ht 1.829 m (6')   Wt 71.2 kg (157 lb)   SpO2 100%   BMI 21.29 kg/m       Cardiac Rhythm:     Pain Score: 0/10    Is the Patient Confused:  Yes    Last Food or Drink: 11/29/23 at 2130    Focused Assessment:  Pt is a/o x2, VSS, may need bed alarm, but pt is calm and cooperative.     Tests Performed: Done: Labs and Imaging    Treatments Provided:      Family Dynamics/Concerns: No    Family Updated On Visitor Policy: Yes    Plan of Care Communicated to Family: Yes    Who Was Updated about Plan of Care: ex-wife    Belongings Checklist Done and Signed by Patient: Yes    Medications sent with patient:     Covid: asymptomatic ,     Additional Information:     RN: SHARI Centeno 11/29/2023 9:59 PM

## 2023-11-30 NOTE — PLAN OF CARE
"PRIMARY DIAGNOSIS: Delirium      OUTPATIENT/OBSERVATION GOALS TO BE MET BEFORE DISCHARGE  1. Orthostatic performed: No    2. Tolerating PO medications: Yes    3. Return to near baseline physical activity:  OT and PT evaluation Unknown baseline physical activity by this RN     4. Cleared for discharge by consultants (if involved): No    Discharge Planner Nurse   Safe discharge environment identified: No   Barriers to discharge: Yes       Entered by: Jesenia Vazquez RN 11/30/2023 10:22 AM     Please review provider order for any additional goals.   Nurse to notify provider when observation goals have been met and patient is ready for discharge.Goal Outcome Evaluation:         /68 (BP Location: Right arm, Patient Position: Sitting, Cuff Size: Adult Regular)   Pulse 55   Temp 97.7  F (36.5  C) (Oral)   Resp 16   Ht 1.829 m (6' 0.01\")   Wt 69.7 kg (153 lb 10.6 oz)   SpO2 100%   BMI 20.84 kg/m     VSS 1:1 continues to be in place Male RN supervision    Disoriented to place and time.                 "

## 2023-11-30 NOTE — PROGRESS NOTES
"PRIMARY DIAGNOSIS: \"GENERIC\" NURSING  OUTPATIENT/OBSERVATION GOALS TO BE MET BEFORE DISCHARGE:  ADLs back to baseline: No    Activity and level of assistance: Ambulating independently.    Pain status: Pain free.    Return to near baseline physical activity: Yes     Discharge Planner Nurse   Safe discharge environment identified: Yes  Barriers to discharge: Yes       Entered by: Freedom Recinos RN 11/30/2023 5:28 PM     Please review provider order for any additional goals.   Nurse to notify provider when observation goals have been met and patient is ready for discharge.    Neurology consult pending EEG result. Hospitalist discussed plan of care with patient's ex-wife, main social support, at bedside. Patient likely to remain on unit overnight.   "

## 2023-11-30 NOTE — PROGRESS NOTES
Bedside EEG was performed on confused pt, that included photic stimulation; hyperventilation was deferred. The patient was awake and asleep throughout the recording. Skin intact.    [id number] used.
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
## 2023-11-30 NOTE — PROGRESS NOTES
"PRIMARY DIAGNOSIS: \"GENERIC\" NURSING  OUTPATIENT/OBSERVATION GOALS TO BE MET BEFORE DISCHARGE:  ADLs back to baseline: No    Activity and level of assistance: Ambulating independently.    Pain status: Pain free.    Return to near baseline physical activity: Yes     Discharge Planner Nurse   Safe discharge environment identified: No  Barriers to discharge: Yes       Entered by: Freedom Recinos RN 11/30/2023 5:24 PM     Please review provider order for any additional goals.   Nurse to notify provider when observation goals have been met and patient is ready for discharge.    Neurology consultation pending placed, EEG ordered.    "

## 2023-12-01 VITALS
SYSTOLIC BLOOD PRESSURE: 111 MMHG | HEART RATE: 59 BPM | RESPIRATION RATE: 16 BRPM | DIASTOLIC BLOOD PRESSURE: 68 MMHG | BODY MASS INDEX: 21.35 KG/M2 | WEIGHT: 157.63 LBS | TEMPERATURE: 97.7 F | HEIGHT: 72 IN | OXYGEN SATURATION: 100 %

## 2023-12-01 LAB
ATRIAL RATE - MUSE: 63 BPM
DIASTOLIC BLOOD PRESSURE - MUSE: NORMAL MMHG
INTERPRETATION ECG - MUSE: NORMAL
P AXIS - MUSE: 78 DEGREES
PR INTERVAL - MUSE: 158 MS
QRS DURATION - MUSE: 94 MS
QT - MUSE: 410 MS
QTC - MUSE: 419 MS
R AXIS - MUSE: 79 DEGREES
SYSTOLIC BLOOD PRESSURE - MUSE: NORMAL MMHG
T AXIS - MUSE: 65 DEGREES
VENTRICULAR RATE- MUSE: 63 BPM

## 2023-12-01 PROCEDURE — 99239 HOSP IP/OBS DSCHRG MGMT >30: CPT | Performed by: INTERNAL MEDICINE

## 2023-12-01 PROCEDURE — 93005 ELECTROCARDIOGRAM TRACING: CPT | Performed by: REGISTERED NURSE

## 2023-12-01 PROCEDURE — 93010 ELECTROCARDIOGRAM REPORT: CPT | Performed by: INTERNAL MEDICINE

## 2023-12-01 PROCEDURE — G0378 HOSPITAL OBSERVATION PER HR: HCPCS

## 2023-12-01 PROCEDURE — 99215 OFFICE O/P EST HI 40 MIN: CPT | Performed by: PSYCHIATRY & NEUROLOGY

## 2023-12-01 PROCEDURE — 99254 IP/OBS CNSLTJ NEW/EST MOD 60: CPT | Performed by: REGISTERED NURSE

## 2023-12-01 PROCEDURE — 93005 ELECTROCARDIOGRAM TRACING: CPT

## 2023-12-01 RX ORDER — QUETIAPINE FUMARATE 25 MG/1
12.5-25 TABLET, FILM COATED ORAL 3 TIMES DAILY PRN
Qty: 30 TABLET | Refills: 1 | Status: SHIPPED | OUTPATIENT
Start: 2023-12-01 | End: 2024-02-22

## 2023-12-01 RX ORDER — TRAZODONE HYDROCHLORIDE 50 MG/1
50 TABLET, FILM COATED ORAL AT BEDTIME
Qty: 30 TABLET | Refills: 3 | Status: SHIPPED | OUTPATIENT
Start: 2023-12-01

## 2023-12-01 RX ORDER — OLANZAPINE 5 MG/1
5 TABLET, ORALLY DISINTEGRATING ORAL EVERY 8 HOURS PRN
Status: DISCONTINUED | OUTPATIENT
Start: 2023-12-01 | End: 2023-12-01 | Stop reason: HOSPADM

## 2023-12-01 RX ORDER — OLANZAPINE 10 MG/2ML
5 INJECTION, POWDER, FOR SOLUTION INTRAMUSCULAR EVERY 8 HOURS PRN
Status: DISCONTINUED | OUTPATIENT
Start: 2023-12-01 | End: 2023-12-01 | Stop reason: HOSPADM

## 2023-12-01 ASSESSMENT — ACTIVITIES OF DAILY LIVING (ADL)
ADLS_ACUITY_SCORE: 35

## 2023-12-01 NOTE — PLAN OF CARE
"PRIMARY DIAGNOSIS: \"GENERIC\" NURSING  OUTPATIENT/OBSERVATION GOALS TO BE MET BEFORE DISCHARGE:  ADLs back to baseline: No    Activity and level of assistance: Ambulating independently.    Pain status: Pain free.    Return to near baseline physical activity: No     Discharge Planner Nurse   Safe discharge environment identified: No  Barriers to discharge: Yes       Entered by: Faheem Valle RN 12/01/2023 5:26 AM     Please review provider order for any additional goals.   Nurse to notify provider when observation goals have been met and patient is ready for discharge.    "

## 2023-12-01 NOTE — PLAN OF CARE
"PRIMARY DIAGNOSIS: \"GENERIC\" NURSING  OUTPATIENT/OBSERVATION GOALS TO BE MET BEFORE DISCHARGE:  ADLs back to baseline: No    Activity and level of assistance: Ambulating independently.    Pain status: Pain free.    Return to near baseline physical activity: No     Discharge Planner Nurse   Safe discharge environment identified: No  Barriers to discharge: Yes       Entered by: Faheem Valle RN 12/01/2023 5:27 AM     Please review provider order for any additional goals.   Nurse to notify provider when observation goals have been met and patient is ready for discharge.    Alert confused.  Continues on 1 to 1 with sitter. Uneventful night.  Continent of bowel and bladder. Ambulate independently in the room. PIV saline lock. Denies pain.     "
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
## 2023-12-01 NOTE — PLAN OF CARE
"   Expand All Collapse All    PRIMARY DIAGNOSIS: \"GENERIC\" NURSING  OUTPATIENT/OBSERVATION GOALS TO BE MET BEFORE DISCHARGE:  ADLs back to baseline: No     Activity and level of assistance: Ambulating independently.     Pain status: Pain free.     Return to near baseline physical activity: Yes             Discharge Planner Nurse   Safe discharge environment identified: Yes  Barriers to discharge: Yes       Entered by: Freedom Recinos RN 11/30/2023 7:17 PM        Please review provider order for any additional goals.   Nurse to notify provider when observation goals have been met and patient is ready for discharge.     Neurology consult pending EEG result. Hospitalist discussed plan of care with patient's ex-wife, main social support, at bedside. Patient likely to remain on unit overnight.              "

## 2023-12-01 NOTE — PLAN OF CARE
Occupational Therapy: Orders received requesting SLUMs. Chart reviewed.? OT eval completed 11/30/23 - please see note by Korina Ribera OTR for details. SLUMs completed and pt scored 9/30. Will complete orders.

## 2023-12-01 NOTE — UTILIZATION REVIEW
Concurrent stay review; Secondary Review Determination     Under the authority of the Utilization Management Committee, the utilization review process indicated a secondary review on Rob Valderrama.  The review outcome is based on review of the medical records, discussions with staff, and applying clinical experience noted on the date of the review.        (x) Observation Status Appropriate - Concurrent stay review    RATIONALE FOR DETERMINATION   63-year-old male with history of TBI 2 years ago and no other chronic medical issues admitted with rapid cognitive decline.  Was seen by neurology and psychiatry.  Also had OT consult for cognitive evaluation.  Medication changes were initiated including scheduling Seroquel at at bedtime.  Planning to discharge home with home care.    Patient is clinically improving and there is no clear indication to change patient's status to inpatient. The severity of illness, intensity of service provided, expected LOS and risk for adverse outcome make the care appropriate for observation.    The information on this document is developed by the utilization review team in order for the business office to ensure compliance.  This only denotes the appropriateness of proper admission status and does not reflect the quality of care rendered.         The definitions of Inpatient Status and Observation Status used in making the determination above are those provided in the CMS Coverage Manual, Chapter 1 and Chapter 6, section 70.4.      Sincerely,   Tu Moncada MD  Utilization Review  Physician Advisor  [facility]

## 2023-12-01 NOTE — DISCHARGE INSTRUCTIONS
Home care services have been arranged for the patient.  Home Care Agency: [facility] Phone Number: [phone]   Services: Nursing  Instructions: Home Care will call to arrange first visit. You will need an insurance prior authorization. Home care agency will call you with results of the authorization.

## 2023-12-01 NOTE — PLAN OF CARE
PRIMARY DIAGNOSIS: GENERALIZED WEAKNESS    OUTPATIENT/OBSERVATION GOALS TO BE MET BEFORE DISCHARGE  1. Orthostatic performed: Yes:                                    2. Tolerating PO medications: Yes    3. Return to near baseline physical activity: Yes    4. Cleared for discharge by consultants (if involved): No    Discharge Planner Nurse   Safe discharge environment identified: Yes  Barriers to discharge: Yes       Entered by: Sydnee Dozier RN 12/01/2023 4:40 PM     Please review provider order for any additional goals.   Nurse to notify provider when observation goals have been met and patient is ready for discharge.Goal Outcome Evaluation
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
## 2023-12-01 NOTE — PLAN OF CARE
Goal Outcome Evaluation:                      Discharge to home with former spouse.   Instructions reviewed. Meds sent with patient.

## 2023-12-01 NOTE — PLAN OF CARE
"PRIMARY DIAGNOSIS: \"GENERIC\" NURSING  OUTPATIENT/OBSERVATION GOALS TO BE MET BEFORE DISCHARGE:  ADLs back to baseline: Yes    Activity and level of assistance: Ambulating independently.    Pain status: Pain free.    Return to near baseline physical activity: Yes     Discharge Planner Nurse   Safe discharge environment identified: Yes  Barriers to discharge: Yes       Entered by: Sydnee Dozier RN 12/01/2023 5:13 PM     Please review provider order for any additional goals.   Nurse to notify provider when observation goals have been met and patient is ready for discharge.Goal Outcome Evaluation: Pt on 1:1 with an RN, Pt continued to be confused , independent with ADLs. Wife at the bedside.                        "

## 2023-12-01 NOTE — DISCHARGE SUMMARY
[facility]    Discharge Summary  Hospitalist    Date of Admission:  11/29/2023  Date of Discharge:  12/1/2023  Discharging Provider: Zachary Mccall MD, MD    Discharge Diagnoses   Principal Problem:    Acute cognitive decline -- suspect underlying early onset Dementia (either Alzheimer's or Possible Lewy Body Dementia)    Active Problems:    Hx of traumatic brain injury 2021      Speech problem      Hallucinations      History of Present Illness   63 year old male who presents with rapid cognitive decline over the past several days with confusion, hallucinations, forgetfulness, speech impairment and paranoia.  Currently the patient denies any complaints other than feeling confused.     Hospital Course   Admitted to medical floor, and first night was agitated, then next night given Trazodone 50 mg at bedtime and slept better.  Was seen by Neurology, Brain MRI with no focal abnormalities.  Was seen by PT and OT, and had SLUMS score of 9 out of 30 consistent with moderate cognitive impairment.  The hx suggest gradual cognitive decline over the last 12 to 18 months, and unclear if prior TBI is contributing to this current decline.  He is currently living with his Ex-wife and she is his primary care giver.  He had Neuropsych testing set up in 2 weeks and follow-up with Neurology in 4-6 weeks.  He did have some hallucinations while here which consisted of vivid dreams which he thought were real.  Home care was requested to help his Ex-wife with any cares at home.     Zachary Mccall MD  Pager: [phone]  Cell Phone:  [phone]       Significant Results and Procedures   As above    Pending Results   These results will be followed up by Dr. Mccall  Unresulted Labs Ordered in the Past 30 Days of this Admission       Date and Time Order Name Status Description    11/30/2023  9:35 AM Vitamin B1 whole blood In process             Code Status   Full Code       Primary Care  "Physician   Nam Burns    Physical Exam   Temp: 97.7  F (36.5  C) Temp src: Oral BP: 111/68 Pulse: 59   Resp: 16 SpO2: 100 % O2 Device: None (Room air)    Vitals:    11/29/23 1343 11/29/23 2213 11/30/23 1300   Weight: 71.2 kg (157 lb) 69.7 kg (153 lb 10.6 oz) 71.5 kg (157 lb 10.1 oz)     Vital Signs with Ranges  Temp:  [97.6  F (36.4  C)-98  F (36.7  C)] 97.7  F (36.5  C)  Pulse:  [57-59] 59  Resp:  [16-18] 16  BP: (108-111)/(62-68) 111/68  SpO2:  [98 %-100 %] 100 %  I/O last 3 completed shifts:  In: 600 [P.O.:600]  Out: -     Exam on discharge:   Lungs Clear  CV reg S1S2  Neuro -- alert, Ox1 -- did not know where he was, what month it was, or what year it was.  He did not know the president but guessed \"Biden\" when given the hint \"Luis\".     Discharge Disposition   Discharged to home  Condition at discharge: Stable    Consultations This Hospital Stay   OCCUPATIONAL THERAPY ADULT IP CONSULT  NEUROLOGY IP CONSULT  CARE MANAGEMENT / SOCIAL WORK IP CONSULT  PSYCHIATRY IP CONSULT  OCCUPATIONAL THERAPY ADULT IP CONSULT    Time Spent on this Encounter   I spent a total of 35 minutes discharging this patient.     Discharge Orders      Home Care Referral      Reason for your hospital stay    Memory loss, hallucinating.     Follow-up and recommended labs and tests     Follow up with primary care provider, Nam Burns, as needed and follow-up with Neurology in 2-3 weeks, call Neurology Clinic at [phone] for an appointment.     Call Dr. Mondragon if any medical questions at Cell Phone [phone].     Activity    Your activity upon discharge: activity as tolerated     Diet    Follow this diet upon discharge: Orders Placed This Encounter      Regular Diet Adult     Discharge Medications   Current Discharge Medication List        START taking these medications    Details   QUEtiapine (SEROQUEL) 25 MG tablet Take 0.5-1 tablets (12.5-25 mg) by mouth 3 times daily as needed (agitation)  Qty: 30 tablet, Refills: 1    " Associated Diagnoses: Delirium      traZODone (DESYREL) 50 MG tablet Take 1 tablet (50 mg) by mouth at bedtime  Qty: 30 tablet, Refills: 3    Associated Diagnoses: Delirium           Allergies   No Known Allergies  Data   Most Recent 3 CBC's:  Recent Labs   Lab Test 11/29/23  1600   WBC 8.3   HGB 13.3   MCV 89         Most Recent 3 BMP's:  Recent Labs   Lab Test 11/29/23  1600      POTASSIUM 4.3   CHLORIDE 105   CO2 28   BUN 24.7*   CR 0.98   ANIONGAP 8   MICHELLE 9.3   GLC 98     Most Recent 2 LFT's:  Recent Labs   Lab Test 11/29/23  1600   AST 26   ALT 47   ALKPHOS 123   BILITOTAL 0.3     Most Recent INR's and Anticoagulation Dosing History:  Anticoagulation Dose History           No data to display              Most Recent 3 Troponin's:No lab results found.  Most Recent Cholesterol Panel:No lab results found.  Most Recent 6 Bacteria Isolates From Any Culture (See EPIC Reports for Culture Details):No lab results found.  Most Recent TSH, T4 and A1c Labs:  Recent Labs   Lab Test 10/03/23  1634   TSH 0.65   T4 1.16

## 2023-12-01 NOTE — PROGRESS NOTES
Care Management Follow Up    Length of Stay (days): 0    Expected Discharge Date: 2023     Concerns to be Addressed:       Patient plan of care discussed at interdisciplinary rounds: Yes    Anticipated Discharge Disposition:  Home     Anticipated Discharge Services:  Home care RN  Anticipated Discharge DME:      Patient/family educated on Medicare website which has current facility and service quality ratings:  NA  Education Provided on the Discharge Plan:  Yes  Patient/Family in Agreement with the Plan:  Yes    Referrals Placed by CM/SW:  home care  Private pay costs discussed: Not applicable    Additional Information:  MD asked CM to send referrals for home care RN. CM met with patient and ex wife, Soha who are agreeable to home care. Referrals sent and pending. Soha also asked for grief/support resources for their children so they can try to cope with patient's medical condition. CM provided [facility] grief resources.     Of note, patient discharging to Soha's [place] at [facility] [place] [facility].     1:53 PM  Received call from [facility] stating they can accept patient however due to his insurance they need to get a prior auth and Kay states that Medicaid typically denies the auth. They state they will submit auth once orders are received and follow up with patient on results of prior auth.     ARLIN Saldaña    Care Management Discharge Note    Discharge Date: 2023       Discharge Disposition:  Home    Discharge Services:  Home care RN    Discharge DME:  None    Discharge Transportation:      Private pay costs discussed: Not applicable    Does the patient's insurance plan have a 3 day qualifying hospital stay waiver?  No    PAS Confirmation Code:    Patient/family educated on Medicare website which has current facility and service quality ratings:      Education Provided on the Discharge Plan:    Persons Notified of Discharge Plans: MD, RN,CM,  patient  Patient/Family in Agreement with the Plan:      Handoff Referral Completed: Yes    Additional Information:  Patient discharging home with [facility] home RN.     JUSTIN SaldañaW

## 2023-12-04 LAB — VIT B1 PYROPHOSHATE BLD-SCNC: 170 NMOL/L

## 2023-12-06 ENCOUNTER — VIRTUAL VISIT (OUTPATIENT)
Dept: NEUROLOGY | Facility: CLINIC | Age: 63
End: 2023-12-06
Attending: PHYSICAL MEDICINE & REHABILITATION
Payer: COMMERCIAL

## 2023-12-06 DIAGNOSIS — G31.85 CORTICOBASAL SYNDROME (H): ICD-10-CM

## 2023-12-06 DIAGNOSIS — F43.23 ADJUSTMENT DISORDER WITH MIXED ANXIETY AND DEPRESSED MOOD: ICD-10-CM

## 2023-12-06 DIAGNOSIS — F03.90 MAJOR NEUROCOGNITIVE DISORDER (H): Primary | ICD-10-CM

## 2023-12-06 DIAGNOSIS — R48.2 APRAXIA: ICD-10-CM

## 2023-12-06 DIAGNOSIS — S06.9X0D MILD TRAUMATIC BRAIN INJURY, WITHOUT LOSS OF CONSCIOUSNESS, SUBSEQUENT ENCOUNTER: ICD-10-CM

## 2023-12-06 PROCEDURE — 96132 NRPSYC TST EVAL PHYS/QHP 1ST: CPT | Mod: FQ | Performed by: CLINICAL NEUROPSYCHOLOGIST

## 2023-12-06 PROCEDURE — 96116 NUBHVL XM PHYS/QHP 1ST HR: CPT | Mod: FQ | Performed by: CLINICAL NEUROPSYCHOLOGIST

## 2023-12-06 PROCEDURE — 96133 NRPSYC TST EVAL PHYS/QHP EA: CPT | Mod: FQ | Performed by: CLINICAL NEUROPSYCHOLOGIST

## 2023-12-06 PROCEDURE — 96121 NUBHVL XM PHY/QHP EA ADDL HR: CPT | Mod: FQ | Performed by: CLINICAL NEUROPSYCHOLOGIST

## 2023-12-06 PROCEDURE — 96138 PSYCL/NRPSYC TECH 1ST: CPT | Mod: FQ | Performed by: CLINICAL NEUROPSYCHOLOGIST

## 2023-12-06 PROCEDURE — 96139 PSYCL/NRPSYC TST TECH EA: CPT | Mod: FQ | Performed by: CLINICAL NEUROPSYCHOLOGIST

## 2023-12-06 NOTE — LETTER
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 enhancement    EEG dated 11/30/2023 revealed:  Impression  Mild to moderately abnormal awake and drowsy EEG due to nonspecific diffuse 5-6 Hz theta disorganization of the background.     Clinical correlation  This record would be indicative of diffuse cerebral dysfunction, consistent with diffuse processes such as metabolic toxic infectious anoxic or degenerative type disease.    Current medications include (per medical record):   Current Outpatient Medications:      QUEtiapine (SEROQUEL) 25 MG tablet, Take 0.5-1 tablets (12.5-25 mg) by mouth 3 times daily as needed (agitation), Disp: 30 tablet, Rfl: 1     traZODone (DESYREL) 50 MG tablet, Take 1 tablet (50 mg) by mouth at bedtime, Disp: 30 tablet, Rfl: 3    RELEVANT FAMILY MEDICAL HISTORY:   There is no known family neurologic history. His father abused alcohol. The patient's brother committed suicide.     PSYCHIATRIC HISTORY:  With regard to his psychiatric history, Mr. Valderrama endorsed a history of depression in the past, along with \"OCD\" tendencies (some perfectionism). He participated in individual and couple's counseling in the past. The patient reported " "that he went through several significant losses and traumas in recent years. The TBI was obviously a significant stressor, as he stated that the injury \"fucked everything up\" including \"my body, my brain... 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  However, Soha later corrected him, as those events actually happened over the course of over 15 years (not around the same time, like the patient reported). She stated that his brother committed suicide In January 2007, and in June 2007 he lost his job at [employer]. I believe they were already  by the time he sustained his TBI in 2021.    Currently, the patient reported that he is \"terrified\" about his future and what is going on with him. He feels significantly \"down and depressed\" because he is unable to do many things. Soha reported that he has become more sentimental, emotional, irritable, and more easily agitated. She also stated that he is more unfiltered since the TBI. She worries that his symptoms may be due (in part) to unresolved traumas, as he has never fully processed or dealt with the loss of his brother or the other losses he has sustained over the years. Mr. Valderrama denied any current suicidal ideation.    Mr. Valderrama denied any current or historical substance abuse.    SOCIAL HISTORY:  Mr. Valderrama was born and raised in [facility]. He and his family moved to the  when he was 5 years old, and he learned English shortly thereafter. He remains fluent in Portuguese and English now. Complications with his birth were denied, as were any developmental delays. He graduated high school and went on to earn a Bachelor's degree at [employer], where he double-majored in Political Science and Sociology. He noted that he always earned above average grades and he did not have to work very hard " to earn straight A's. Significant learning difficulties or developmental attention issues were denied. He worked in very prestigious, high-level jobs, including  at [employer] and [employer], running [employer], and more. 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 The patient reported that he has not worked since being let go from [employer]; however, Soha reported that he has had several jobs since [employer], including one from [phone] and another one from [id number].  She stated that he was never able to get a job after 2016, and she is uncertain as to why. He was fairly depressed at that time as a result. He was  to Soha for 25 years, and they have 4 children together. For leisure, the patient enjoys painting, which is a skill that reportedly has not been affected at all by his symptoms.    TESTS ADMINISTERED:   Wechsler Memory Scale-III (WMS-III) select subtests, Wechsler Adult Intelligence Scale-IV (WAIS-IV) select subtests, Wide Range Achievement Test-5 (WRAT-5) select subtests, Wechsler Memory Scale-IV (WMS-IV) select subtests, De Luna Verbal Learning Test-R (HVLT-R), Trailmaking Test (Trails A & B),  FAS and Animal Fluency, Roland Naming Test-2 (BNT-2), BAILON Token Test, Peabody Picture Vocabulary Test-4 (PPVT-4), Cookie Theft test, Apraxia Screen of María (AST), Independent Living Scales (ILS) - Health & Safety subtest.    [facility] norms were used for BNT, FAS & Animal Fluency, Trail Making Test A & B    DESCRIPTIVE PERFORMANCE KEY:    Labels for tests with Normal Distributions  Score Label Standard Score %ile Rank   Exceptionally high score  > 130 > 98   Above average score 120-129 91-97   High average score 110-119 75-90   Average score  25-74   Low average score 80-89 9-24   Below  average score 70-79 2-8   Exceptionally low score < 70 < 2     Labels for tests with Non-Normal Distributions  Score Label %ile Rank   Within normal expectations/ limits score (WNL) > 24   Low average score 9-24   Below average score 2-8   Exceptionally low score < 2     The following test results utilize score labels as adapted from Tony Acosta, Neri Sims, Quita Vera, SHENA Haynes, Geni Dasilva, Lj Noe & Conference Participatnts (2020): American Academy of Clinical Neuropsychology consensus conference statement on uniform labeling of performance test scores, The Clinical Neuropsychologist, DOI: 10.1080/[email or web address]. All scores contain some measure of error; scores are reported here as they are obtained by the individual (without reference to the range of error). These are meant as labels and not interpretation of performance. While other relevant comments regarding task performance are provided below, please see the Summary, Impressions, and Diagnosis sections of this report for interpretation of the scores and the cognitive profile as a whole, including what does and does not constitute impairment for this particular individual.    BEHAVIORAL OBSERVATIONS:   The clinical interview was performed via video visit on 12/6/2023.  Mr. Valderrama was accompanied by his ex-wife (Soha) and their daughter (Gil). He was appropriately dressed and groomed. He appeared alert and engaged. No vision or hearing difficulties were observed. Conversational speech was somewhat effortful at times, as it was marked with an occasional stutter and frequent paraphasic errors, but was otherwise of normal rate, volume, and prosody.  Some word-finding pauses were also noted.  He cursed frequently, primarily when discussing his brain injury and frustration with his symptoms.  His thought process appeared slightly tangential and repetitive.  He had difficulty following  "the conversation at times.  No hallucinations or delusions were apparent today. Judgment and insight appeared limited. His mood was dysthymic and his affect was appropriately reactive. Rapport was easily established and eye contact was appropriate.     During the in-person testing session on 12/11/2023, Mr. Valderrama was alert throughout. He was pleasant and cooperative throughout the evaluation. He had difficulty understanding some task demands, despite repetition and simplification of instructions. His speech was observed to be dysarthric and marked with a stutter, word-finding difficulty, and paraphasic errors. He was observed to be tangential and highly stimulus-bound. No other frontal signs were observed behaviorally during testing. Mr. Valderrama appeared adequately motivated and engaged easily during testing. Overall, the following results are considered a reasonably valid estimation of his current cognitive abilities.    OPTIMAL PREMORBID INTELLECT:  Optimal premorbid intellectual abilities were estimated as falling in the high average range based on Mr. Valderrama's educational and occupational histories and performance on tasks least likely to be affected by acquired brain dysfunction.    SUMMARY OF TEST RESULTS:  ORIENTATION. Performance on a mental status exam, assessing orientation to personal and current information, resulted in a below average score. He was oriented to most personal information (although he stated his age is \"62\" instead of 63). He also correctly stated the current year, and was able to correctly name the current and previous presidents. However, he stated that it was \"September\" instead of December. He did not know the day of the month. He guessed that it was \"Tuesday\" instead of Monday. He did not know the city or the name of our clinic.     ATTENTION/WORKING MEMORY. The patient's score on a measure sensitive to sustained auditory-verbal attention and concentration (WAIS-IV Digit Span) was " "classified as exceptionally low, as he was able to recite up to 6 digits forward (an average score), up to 2 digits backward (a below average score), and up to 3 digits in sequence (an exceptionally low score). On a measure of speeded complex concentration and mental flexibility, the patient's overall score was below average ( (WMS-III Mental Control).     PROCESSING SPEED. On a measure of speeded digit-symbol coding, the patient had significant difficulty understanding the instructions, so the test was discontinued (WAIS-IV Coding). On a test of complex concentration that requires speeded numeric sequencing (Trails A), the patient's score was exceptionally low for completion time and 3 errors were made. He appeared to start drawing lines without knowing yet where he was going.       LANGUAGE PROCESSING. Language comprehension, measured by the patient's ability to follow single and multi-step commands, was measured as exceptionally low (BAILON Token Test). His ability to repeat increasingly lengthy and complex sentences was rated as \"defective.\" On a measure of single-word reading (WRAT-5 Word Reading), he tended to start at seemingly random places on the line or would randomly skip some words, but his total score was in the upper limit of the average range. His responses on the Cookie Theft task were unremarkable. Confrontation naming was measured as a low average score (53/60; BNT-2). The patient's performance on a test of phonemic fluency resulted in a average score (FAS), and his semantic fluency was exceptionally low (Animals).    VISUOSPATIAL/CONSTRUCTIONAL SKILLS. Visuoconstruction with three-dimensional blocks was measured as exceptionally low (WAIS-IV Block Design; raw score = 1). He was extremely stimulus-bound during that task, often touching the examiner's blocks instead of working with his own. Copy of simple geometric figures was exceptionally low and was ultimately discontinued due to the level of " "difficulty he was having (WMS-IV Visual Reproduction Copy).       LEARNING/MEMORY. On a 12-item verbal list-learning task (HVLT-R), the patient recited up to 4 words (33%) of the word list by the 3rd and final learning trial (raw scores over trials = 2, 4, 4). Total learning acquisition was classified as an exceptionally low score. After a 20-minute delay, the patient recalled 1 item (and made 4 intrusion errors), reflecting an exceptionally low score with a 25% retention rate. His recognition discriminability score was exceptionally low, as he correctly recognized 6/12 items and made 6 false-positive errors.    On a visual memory measure (WMS-IV Visual Reproduction), immediate recall of simple geometric figures was exceptionally low, while delayed recall of the figures was below average. Delayed recognition of the figures was exceptionally low.     EXECUTIVE FUNCTIONS. On a measure of speeded complex concentration and mental flexibility, the patient's overall score was below average (WMS-III Mental Control). On a test that requires speeded alpha-numeric sequencing/cognitive set-shifting (Trails B), the patient had significant difficulty understanding task demands, so it was ultimately discontinued on the sample trial. Phonemic fluency was average (FAS).      INDEPENDENT LIVING SKILLS. The patient was administered the Health & Safety subtest of the Independent Living Skills measure, which assesses the patient's judgment and problem-solving abilities as it pertains to various hypothetical health and safety dilemmas. Compared to a healthy, community-dwelling geriatric population, the patient's score was below average. Although most of his responses were appropriate, he tended to leave out the most appropriate response. For example, when asked what he would do if he needed medical help quickly, he stated that he would \"call my sister or my neighbor.\"     PRAXIS. On a screening measure of upper limb apraxia, the patient " had significant difficulty, mostly with his left hand.  His total score with his left hand was 7/12 and his total score with his right hand was 9/12 (the cutoff score for apraxia is < 9). He often utilized both hands when asked to just use his left, or he was unable to place his hands in the specified location at times. He also had difficulty curling his fingers on his left hand on one of the items.    ____________________________________________________________________________________    SERVICES PROVIDED & TIME:  Video and On-site Office Visit Details   Verbal consent for neuropsychological testing was received following the provision of information about the nature and purpose of the evaluation, and the opportunity to ask questions. Verbal permission to route a copy of the final report to her primary care provider was also obtained.  A clinical interview/neurobehavioral status examination was conducted with the patient and documented. I thoroughly reviewed the medical record, selected the neuropsychological test battery, provided supervision to the trained examiner/technician, interpreted/integrated patient data and test results, and engaged in clinical decision making, treatment planning and report writing/preparation. A trained examiner/technician administered and scored the neuropsychological tests (2+ tests).  Please see below for a breakdown of time spent and the associated codes billed for these services.  Services   Time Spent  CPT Codes   Neurobehavioral Status Exam:  (e.g., clinical interview and neurobehavioral status exam, interpretation, report)   157 minutes   1 x 96116  2 x 96121   Neuropsychological Evaluation Services:   (e.g., integration, interpretation, treatment planning, clinical decision making)   165 minutes   1 x 96132  2 x 96133   Neuropsychological Testing by Trained Examiner/Technician:  (e.g., test administration, scoring, 2+ tests administered)   160 minutes   1 x 96138  4 x 96139    For diagnostic and coding purposes, Mr. Valderrama has a history of mild traumatic brain injury and situational depression. He was referred for an evaluation of major neurocognitive disorder.            Again, thank you for allowing me to participate in the care of your patient.        Sincerely,        Radha Oneill Psy.D, LP

## 2023-12-06 NOTE — PROGRESS NOTES
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 record would be indicative of diffuse cerebral dysfunction, consistent with diffuse processes such as metabolic toxic infectious anoxic or degenerative type disease.    Current medications include (per medical record):   Current Outpatient Medications:      QUEtiapine (SEROQUEL) 25 MG tablet, Take 0.5-1 tablets (12.5-25 mg) by mouth 3 times daily as needed (agitation), Disp: 30 tablet, Rfl: 1     traZODone (DESYREL) 50 MG tablet, Take 1 tablet (50 mg) by mouth at bedtime, Disp: 30 tablet, Rfl: 3    RELEVANT FAMILY MEDICAL HISTORY:   There is no known family neurologic history. His father abused alcohol. The patient's brother committed suicide.     PSYCHIATRIC HISTORY:  With regard to his psychiatric history, Mr. Valderrama endorsed a history of depression in the past, along with \"OCD\" tendencies (some perfectionism). He participated in individual and couple's counseling in the past. The patient reported that he went through several significant losses and traumas in recent years. The TBI was obviously a significant stressor, as he stated that the injury \"fucked everything up\" including \"my body, my brain... 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  However, Soha later corrected him, as those events actually happened over the course of over 15 years (not around the same time, like the patient reported). She stated that his brother committed suicide In January 2007, and in June 2007 he lost his job at [employer]. I believe they were already  by the time he sustained his TBI in 2021.    Currently, the patient reported that he is \"terrified\" about his future and what is going on with him. He feels significantly \"down and depressed\" because he is unable to do many things. Soha reported that he has become more sentimental, emotional, irritable, and more easily agitated. She also stated that he is more unfiltered since the TBI. She worries that his symptoms may be due (in part) to unresolved traumas, as he has never fully processed or dealt with the loss of his brother or the other losses he has sustained over the years. Mr. Valderrama denied any current suicidal ideation.    Mr. Valderrama denied any current or historical substance abuse.    SOCIAL HISTORY:  Mr. Valderrama was born and raised in [place]. He and his family moved to the [place] when he was 5 years old, and he learned English shortly thereafter. He remains fluent in Maltese and English now. Complications with his birth were denied, as were any developmental delays. He graduated high school and went on to earn a Bachelor's degree at [employer], where he double-majored in Political Science and Sociology. He noted that he always earned above average grades and he did not have to work very hard to earn straight A's. Significant learning difficulties or developmental attention issues were denied. He worked in very prestigious, high-level jobs, including  at [employer] and [employer], running [employer], and more. 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 The patient reported that he has not worked since being let go from [employer]; however, Soha reported that he has had several jobs since [employer], including one from [id number] and another one from [id number].  She stated that he was never able to get a job after 2016, and she is uncertain as to why. He was fairly depressed at that time as a result. He was  to Soha for 25 years, and they have 4 children together. For leisure, the patient enjoys painting, which is a skill that reportedly has not been affected at all by his symptoms.    TESTS ADMINISTERED:   Wechsler Memory Scale-III (WMS-III) select subtests, Wechsler Adult Intelligence Scale-IV (WAIS-IV) select subtests, Wide Range Achievement Test-5 (WRAT-5) select subtests, Wechsler Memory Scale-IV (WMS-IV) select subtests, De Luna Verbal Learning Test-R (HVLT-R), Trailmaking Test (Trails A & B),  FAS and Animal Fluency, Yeaddiss Naming Test-2 (BNT-2), BAILON Token Test, Peabody Picture Vocabulary Test-4 (PPVT-4), Cookie Theft test, Apraxia Screen of María (AST), Independent Living Scales (ILS) - Health & Safety subtest.    [facility] norms were used for BNT, FAS & Animal Fluency, Trail Making Test A & B    DESCRIPTIVE PERFORMANCE KEY:    Labels for tests with Normal Distributions  Score Label Standard Score %ile Rank   Exceptionally high score  > 130 > 98   Above average score 120-129 91-97   High average score 110-119 75-90   Average score  25-74   Low average score 80-89 9-24   Below average score 70-79 2-8   Exceptionally low score < 70 < 2     Labels for tests with Non-Normal Distributions  Score Label %ile Rank   Within normal expectations/ limits score (WNL) > 24   Low average score 9-24   Below average score 2-8   Exceptionally low score < 2     The  following test results utilize score labels as adapted from Tony Acosta, Neri Sims, Quita Vera, SHENA Haynes, Geni Dasilva, Lj Noe & Conference Participatnts (2020): American Academy of Clinical Neuropsychology consensus conference statement on uniform labeling of performance test scores, The Clinical Neuropsychologist, DOI: 10.1080/[id number]. All scores contain some measure of error; scores are reported here as they are obtained by the individual (without reference to the range of error). These are meant as labels and not interpretation of performance. While other relevant comments regarding task performance are provided below, please see the Summary, Impressions, and Diagnosis sections of this report for interpretation of the scores and the cognitive profile as a whole, including what does and does not constitute impairment for this particular individual.    BEHAVIORAL OBSERVATIONS:   The clinical interview was performed via video visit on 12/6/2023.  Mr. Valderrama was accompanied by his ex-wife (Soha) and their daughter (Gil). He was appropriately dressed and groomed. He appeared alert and engaged. No vision or hearing difficulties were observed. Conversational speech was somewhat effortful at times, as it was marked with an occasional stutter and frequent paraphasic errors, but was otherwise of normal rate, volume, and prosody.  Some word-finding pauses were also noted.  He cursed frequently, primarily when discussing his brain injury and frustration with his symptoms.  His thought process appeared slightly tangential and repetitive.  He had difficulty following the conversation at times.  No hallucinations or delusions were apparent today. Judgment and insight appeared limited. His mood was dysthymic and his affect was appropriately reactive. Rapport was easily established and eye contact was appropriate.     During the in-person  "testing session on 12/11/2023, Mr. Valderrama was alert throughout. He was pleasant and cooperative throughout the evaluation. He had difficulty understanding some task demands, despite repetition and simplification of instructions. His speech was observed to be dysarthric and marked with a stutter, word-finding difficulty, and paraphasic errors. He was observed to be tangential and highly stimulus-bound. No other frontal signs were observed behaviorally during testing. Mr. Valderrama appeared adequately motivated and engaged easily during testing. Overall, the following results are considered a reasonably valid estimation of his current cognitive abilities.    OPTIMAL PREMORBID INTELLECT:  Optimal premorbid intellectual abilities were estimated as falling in the high average range based on Mr. Valderrama's educational and occupational histories and performance on tasks least likely to be affected by acquired brain dysfunction.    SUMMARY OF TEST RESULTS:  ORIENTATION. Performance on a mental status exam, assessing orientation to personal and current information, resulted in a below average score. He was oriented to most personal information (although he stated his age is \"62\" instead of 63). He also correctly stated the current year, and was able to correctly name the current and previous presidents. However, he stated that it was \"September\" instead of December. He did not know the day of the month. He guessed that it was \"Tuesday\" instead of Monday. He did not know the city or the name of our clinic.     ATTENTION/WORKING MEMORY. The patient's score on a measure sensitive to sustained auditory-verbal attention and concentration (WAIS-IV Digit Span) was classified as exceptionally low, as he was able to recite up to 6 digits forward (an average score), up to 2 digits backward (a below average score), and up to 3 digits in sequence (an exceptionally low score). On a measure of speeded complex concentration and mental " "flexibility, the patient's overall score was below average ( (WMS-III Mental Control).     PROCESSING SPEED. On a measure of speeded digit-symbol coding, the patient had significant difficulty understanding the instructions, so the test was discontinued (WAIS-IV Coding). On a test of complex concentration that requires speeded numeric sequencing (Trails A), the patient's score was exceptionally low for completion time and 3 errors were made. He appeared to start drawing lines without knowing yet where he was going.       LANGUAGE PROCESSING. Language comprehension, measured by the patient's ability to follow single and multi-step commands, was measured as exceptionally low (BAILON Token Test). His ability to repeat increasingly lengthy and complex sentences was rated as \"defective.\" On a measure of single-word reading (WRAT-5 Word Reading), he tended to start at seemingly random places on the line or would randomly skip some words, but his total score was in the upper limit of the average range. His responses on the Cookie Theft task were unremarkable. Confrontation naming was measured as a low average score (53/60; BNT-2). The patient's performance on a test of phonemic fluency resulted in a average score (FAS), and his semantic fluency was exceptionally low (Animals).    VISUOSPATIAL/CONSTRUCTIONAL SKILLS. Visuoconstruction with three-dimensional blocks was measured as exceptionally low (WAIS-IV Block Design; raw score = 1). He was extremely stimulus-bound during that task, often touching the examiner's blocks instead of working with his own. Copy of simple geometric figures was exceptionally low and was ultimately discontinued due to the level of difficulty he was having (WMS-IV Visual Reproduction Copy).       LEARNING/MEMORY. On a 12-item verbal list-learning task (HVLT-R), the patient recited up to 4 words (33%) of the word list by the 3rd and final learning trial (raw scores over trials = 2, 4, 4). Total " "learning acquisition was classified as an exceptionally low score. After a 20-minute delay, the patient recalled 1 item (and made 4 intrusion errors), reflecting an exceptionally low score with a 25% retention rate. His recognition discriminability score was exceptionally low, as he correctly recognized 6/12 items and made 6 false-positive errors.    On a visual memory measure (WMS-IV Visual Reproduction), immediate recall of simple geometric figures was exceptionally low, while delayed recall of the figures was below average. Delayed recognition of the figures was exceptionally low.     EXECUTIVE FUNCTIONS. On a measure of speeded complex concentration and mental flexibility, the patient's overall score was below average (WMS-III Mental Control). On a test that requires speeded alpha-numeric sequencing/cognitive set-shifting (Trails B), the patient had significant difficulty understanding task demands, so it was ultimately discontinued on the sample trial. Phonemic fluency was average (FAS).      INDEPENDENT LIVING SKILLS. The patient was administered the Health & Safety subtest of the Independent Living Skills measure, which assesses the patient's judgment and problem-solving abilities as it pertains to various hypothetical health and safety dilemmas. Compared to a healthy, community-dwelling geriatric population, the patient's score was below average. Although most of his responses were appropriate, he tended to leave out the most appropriate response. For example, when asked what he would do if he needed medical help quickly, he stated that he would \"call my sister or my neighbor.\"     PRAXIS. On a screening measure of upper limb apraxia, the patient had significant difficulty, mostly with his left hand.  His total score with his left hand was 7/12 and his total score with his right hand was 9/12 (the cutoff score for apraxia is < 9). He often utilized both hands when asked to just use his left, or he was unable " to place his hands in the specified location at times. He also had difficulty curling his fingers on his left hand on one of the items.    ____________________________________________________________________________________    SERVICES PROVIDED & TIME:  Video and On-site Office Visit Details   Verbal consent for neuropsychological testing was received following the provision of information about the nature and purpose of the evaluation, and the opportunity to ask questions. Verbal permission to route a copy of the final report to her primary care provider was also obtained.  A clinical interview/neurobehavioral status examination was conducted with the patient and documented. I thoroughly reviewed the medical record, selected the neuropsychological test battery, provided supervision to the trained examiner/technician, interpreted/integrated patient data and test results, and engaged in clinical decision making, treatment planning and report writing/preparation. A trained examiner/technician administered and scored the neuropsychological tests (2+ tests).  Please see below for a breakdown of time spent and the associated codes billed for these services.  Services   Time Spent  CPT Codes   Neurobehavioral Status Exam:  (e.g., clinical interview and neurobehavioral status exam, interpretation, report)   157 minutes   1 x 96116  2 x 96121   Neuropsychological Evaluation Services:   (e.g., integration, interpretation, treatment planning, clinical decision making)   165 minutes   1 x 96132  2 x 96133   Neuropsychological Testing by Trained Examiner/Technician:  (e.g., test administration, scoring, 2+ tests administered)   160 minutes   1 x 96138  4 x 96139   For diagnostic and coding purposes, Mr. Valderrama has a history of mild traumatic brain injury and situational depression. He was referred for an evaluation of major neurocognitive disorder.

## 2023-12-11 ENCOUNTER — OFFICE VISIT (OUTPATIENT)
Dept: NEUROLOGY | Facility: CLINIC | Age: 63
End: 2023-12-11
Payer: COMMERCIAL

## 2023-12-11 DIAGNOSIS — F03.90 MAJOR NEUROCOGNITIVE DISORDER (H): Primary | ICD-10-CM

## 2023-12-11 NOTE — PROGRESS NOTES
The patient was seen for a neuropsychological evaluation for the purposes of diagnostic clarification and treatment planning. 130 minutes of face-to-face testing were provided by this writer. An additional 30 minutes were spent scoring and compiling test results. The patient was cooperative with testing. No concerns were brought to my attention. Please see Dr. Oneill's report for a detailed description of the charges and interpretation and integration of the findings.

## 2023-12-11 NOTE — PROGRESS NOTES
NEUROPSYCHOLOGICAL PROGRESS NOTE    NAME: Rob Valderrama  YOB: 1960     DATE OF EVALUATION: 12/11/2023      Rob Valderrama is a 63 year-old male who was referred for a cognitive evaluation by [facility] provider, Seble Heredia MD.  Mr. Valderrama already completed the clinical interview with me on 12/6/2023 and returns for another visit today in order to complete the testing portion of the appointment. He was administered a comprehensive neuropsychological battery. He was cooperative during testing and test results appear valid. Please refer to the note by trained examiner/technician from today's date for further details of today's visit. Test results are currently still being compiled and scored; therefore, impressions and findings will be provided in a forthcoming report (please refer to the 12/6/2023 encounter for the full report once it is posted).      Radha Oneill PsyD, LP  Licensed Clinical Neuropsychologist  [facility] [address], [place] [address]  Phone: [phone]

## 2023-12-11 NOTE — LETTER
12/11/2023         RE: Rob Valderrama  [address] [place] [address] [address] [id number]        Dear Colleague,    Thank you for referring your patient, Rob Valderrama, to the [facility]. Please see a copy of my visit note below.    The patient was seen for a neuropsychological evaluation for the purposes of diagnostic clarification and treatment planning. 130 minutes of face-to-face testing were provided by this writer. An additional 30 minutes were spent scoring and compiling test results. The patient was cooperative with testing. No concerns were brought to my attention. Please see Dr. Oneill's report for a detailed description of the charges and interpretation and integration of the findings.      NEUROPSYCHOLOGICAL PROGRESS NOTE    NAME: Rob Valderrama  YOB: 1960     DATE OF EVALUATION: 12/11/2023      Rob Valderrama is a 63 year-old male who was referred for a cognitive evaluation by [facility] provider, Seble Heredia MD.  Mr. Valderrama already completed the clinical interview with me on 12/6/2023 and returns for another visit today in order to complete the testing portion of the appointment. He was administered a comprehensive neuropsychological battery. He was cooperative during testing and test results appear valid. Please refer to the note by trained examiner/technician from today's date for further details of today's visit. Test results are currently still being compiled and scored; therefore, impressions and findings will be provided in a forthcoming report (please refer to the 12/6/2023 encounter for the full report once it is posted).      Radha Oneill PsyD, LP  Licensed Clinical Neuropsychologist  [facility] [address], [address]  Phone: [phone]        Again, thank you for allowing me to participate in the care of your patient.        Sincerely,        Luciana Santizo.SCOTT, JODI

## 2023-12-12 PROBLEM — Z85.46 HISTORY OF PROSTATE CANCER: Status: ACTIVE | Noted: 2023-12-12
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
## 2023-12-13 ENCOUNTER — OFFICE VISIT (OUTPATIENT)
Dept: NEUROLOGY | Facility: CLINIC | Age: 63
End: 2023-12-13
Payer: COMMERCIAL

## 2023-12-13 ENCOUNTER — LAB (OUTPATIENT)
Dept: LAB | Facility: HOSPITAL | Age: 63
End: 2023-12-13
Payer: COMMERCIAL

## 2023-12-13 VITALS
BODY MASS INDEX: 22.08 KG/M2 | WEIGHT: 163 LBS | SYSTOLIC BLOOD PRESSURE: 128 MMHG | DIASTOLIC BLOOD PRESSURE: 76 MMHG | HEART RATE: 83 BPM | HEIGHT: 72 IN

## 2023-12-13 DIAGNOSIS — F03.90 PRESENILE DEMENTIA, UNCOMPLICATED (H): ICD-10-CM

## 2023-12-13 DIAGNOSIS — G31.01 PICK'S DISEASE (H): ICD-10-CM

## 2023-12-13 DIAGNOSIS — F02.80 PICK'S DISEASE (H): ICD-10-CM

## 2023-12-13 DIAGNOSIS — R41.9 NEUROCOGNITIVE DISORDER: Primary | ICD-10-CM

## 2023-12-13 DIAGNOSIS — R41.9 NEUROCOGNITIVE DISORDER: ICD-10-CM

## 2023-12-13 DIAGNOSIS — Z87.820 HX OF TRAUMATIC BRAIN INJURY: ICD-10-CM

## 2023-12-13 LAB
FOLATE SERPL-MCNC: 7.8 NG/ML (ref 4.6–34.8)
TSH SERPL DL<=0.005 MIU/L-ACNC: 0.87 UIU/ML (ref 0.3–4.2)

## 2023-12-13 PROCEDURE — 84443 ASSAY THYROID STIM HORMONE: CPT

## 2023-12-13 PROCEDURE — 36415 COLL VENOUS BLD VENIPUNCTURE: CPT

## 2023-12-13 PROCEDURE — 82746 ASSAY OF FOLIC ACID SERUM: CPT

## 2023-12-13 PROCEDURE — 84425 ASSAY OF VITAMIN B-1: CPT

## 2023-12-13 PROCEDURE — 83090 ASSAY OF HOMOCYSTEINE: CPT

## 2023-12-13 PROCEDURE — 84446 ASSAY OF VITAMIN E: CPT

## 2023-12-13 PROCEDURE — 99215 OFFICE O/P EST HI 40 MIN: CPT | Performed by: PSYCHIATRY & NEUROLOGY

## 2023-12-13 PROCEDURE — 83921 ORGANIC ACID SINGLE QUANT: CPT

## 2023-12-13 PROCEDURE — 82607 VITAMIN B-12: CPT

## 2023-12-13 PROCEDURE — 86780 TREPONEMA PALLIDUM: CPT

## 2023-12-13 NOTE — NURSING NOTE
Chief Complaint   Patient presents with    TBI     Pt states he has been experiencing stuttering speech, depression, will have muscle twitches throughout the day.     Anabela Crawford LPN on 12/13/2023 at 1:36 PM
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
## 2023-12-14 LAB
HCYS SERPL-SCNC: 16.1 UMOL/L (ref 0–15)
T PALLIDUM AB SER QL: NONREACTIVE
VIT B12 SERPL-MCNC: 536 PG/ML (ref 232–1245)

## 2023-12-17 LAB
A-TOCOPHEROL VIT E SERPL-MCNC: 10.4 MG/L
BETA+GAMMA TOCOPHEROL SERPL-MCNC: 0.6 MG/L

## 2023-12-19 ENCOUNTER — TELEPHONE (OUTPATIENT)
Dept: NEUROLOGY | Facility: CLINIC | Age: 63
End: 2023-12-19
Payer: COMMERCIAL

## 2023-12-19 LAB — VIT B1 PYROPHOSHATE BLD-SCNC: 179 NMOL/L

## 2023-12-19 NOTE — TELEPHONE ENCOUNTER
Called and spoke with Soha- scheduled follow up for 1/10/24  Viky Mei CMA on 12/19/2023 at 3:09 PM  [facility]

## 2023-12-19 NOTE — TELEPHONE ENCOUNTER
Health Call Center    Phone Message    May a detailed message be left on voicemail: no     Reason for Call: Other: writer received this message about Pt appt. Please review and call Pt back to discuss.     When we were in for our visit with Dr. Watson he had mentioned that he would make space to get us back into his schedule once we had the Pet Scan done, after having done numerous other tests. If he can't do it earlier is there someone else who can? I understand you all are way over busy. Dr. Watson was a follow up to a hospital visit Rob olson in December.   Action Taken: Message routed to:  Other: [facility]    Travel Screening: Not Applicable

## 2023-12-21 LAB — METHYLMALONATE SERPL-SCNC: 0.37 UMOL/L (ref 0–0.4)

## 2023-12-26 ENCOUNTER — HOSPITAL ENCOUNTER (OUTPATIENT)
Dept: PET IMAGING | Facility: HOSPITAL | Age: 63
Discharge: HOME | End: 2023-12-26
Attending: PSYCHIATRY & NEUROLOGY | Admitting: PSYCHIATRY & NEUROLOGY
Payer: COMMERCIAL

## 2023-12-26 DIAGNOSIS — R41.9 NEUROCOGNITIVE DISORDER: ICD-10-CM

## 2023-12-26 DIAGNOSIS — G31.01 PICK'S DISEASE (H): ICD-10-CM

## 2023-12-26 DIAGNOSIS — F02.80 PICK'S DISEASE (H): ICD-10-CM

## 2023-12-26 DIAGNOSIS — F03.90 PRESENILE DEMENTIA, UNCOMPLICATED (H): ICD-10-CM

## 2023-12-26 LAB — GLUCOSE BLDC GLUCOMTR-MCNC: 88 MG/DL (ref 70–99)

## 2023-12-26 PROCEDURE — 82962 GLUCOSE BLOOD TEST: CPT

## 2023-12-26 PROCEDURE — A9552 F18 FDG: HCPCS | Performed by: PSYCHIATRY & NEUROLOGY

## 2023-12-26 PROCEDURE — 78608 BRAIN IMAGING (PET): CPT

## 2023-12-26 PROCEDURE — 343N000001 HC RX 343: Performed by: PSYCHIATRY & NEUROLOGY

## 2023-12-26 RX ADMIN — FLUDEOXYGLUCOSE F-18 4.95 MILLICURIE: 500 INJECTION, SOLUTION INTRAVENOUS at 07:55

## 2023-12-26 NOTE — RESULT ENCOUNTER NOTE
Claudia Rivas    Brain PET scan shows hypometabolism in the parietal lobe that suggests Alzheimer's dementia.  Changes are not consistent with a diagnosis of frontotemporal dementia.  Follow-up as scheduled. Please continue with the current plan of care and let us know if there are any questions or concerns.    Regards,  Doug Watson MD

## 2024-01-08 PROBLEM — F02.818 MAJOR NEUROCOGNITIVE DISORDER DUE TO ALZHEIMER'S DISEASE, WITH BEHAVIORAL DISTURBANCE (H): Status: ACTIVE | Noted: 2024-01-08

## 2024-01-08 PROBLEM — G30.9 MAJOR NEUROCOGNITIVE DISORDER DUE TO ALZHEIMER'S DISEASE, WITH BEHAVIORAL DISTURBANCE (H): Status: ACTIVE | Noted: 2024-01-08
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
## 2024-01-10 ENCOUNTER — OFFICE VISIT (OUTPATIENT)
Dept: NEUROLOGY | Facility: CLINIC | Age: 64
End: 2024-01-10
Payer: MEDICARE

## 2024-01-10 VITALS
SYSTOLIC BLOOD PRESSURE: 115 MMHG | WEIGHT: 161.8 LBS | BODY MASS INDEX: 21.91 KG/M2 | HEART RATE: 63 BPM | DIASTOLIC BLOOD PRESSURE: 87 MMHG | HEIGHT: 72 IN

## 2024-01-10 DIAGNOSIS — F02.818 MAJOR NEUROCOGNITIVE DISORDER DUE TO ALZHEIMER'S DISEASE, WITH BEHAVIORAL DISTURBANCE (H): Primary | ICD-10-CM

## 2024-01-10 DIAGNOSIS — G30.9 MAJOR NEUROCOGNITIVE DISORDER DUE TO ALZHEIMER'S DISEASE, WITH BEHAVIORAL DISTURBANCE (H): Primary | ICD-10-CM

## 2024-01-10 DIAGNOSIS — A81.00: ICD-10-CM

## 2024-01-10 DIAGNOSIS — R27.0 ATAXIA, UNSPECIFIED: ICD-10-CM

## 2024-01-10 PROCEDURE — 99215 OFFICE O/P EST HI 40 MIN: CPT | Performed by: PSYCHIATRY & NEUROLOGY

## 2024-01-10 RX ORDER — DONEPEZIL HYDROCHLORIDE 10 MG/1
10 TABLET, FILM COATED ORAL AT BEDTIME
Qty: 90 TABLET | Refills: 3 | Status: SHIPPED | OUTPATIENT
Start: 2024-02-10

## 2024-01-10 RX ORDER — DONEPEZIL HYDROCHLORIDE 5 MG/1
5 TABLET, FILM COATED ORAL AT BEDTIME
Qty: 30 TABLET | Refills: 0 | Status: SHIPPED | OUTPATIENT
Start: 2024-01-10 | End: 2024-02-13

## 2024-01-10 RX ORDER — MULTIVIT WITH MINERALS/LUTEIN
1000 TABLET ORAL 2 TIMES DAILY
Qty: 60 CAPSULE | Refills: 11 | Status: SHIPPED | OUTPATIENT
Start: 2024-01-10

## 2024-01-10 RX ORDER — DONEPEZIL HYDROCHLORIDE 10 MG/1
10 TABLET, FILM COATED ORAL AT BEDTIME
Qty: 90 TABLET | Refills: 3 | Status: SHIPPED | OUTPATIENT
Start: 2024-01-10 | End: 2024-01-10

## 2024-01-10 NOTE — NURSING NOTE
Chief Complaint   Patient presents with    TBI     Follow up     Angie Gil on 1/10/2024 at 3:17 PM
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
## 2024-01-16 ENCOUNTER — MYC MEDICAL ADVICE (OUTPATIENT)
Dept: INTERVENTIONAL RADIOLOGY/VASCULAR | Facility: CLINIC | Age: 64
End: 2024-01-16
Payer: MEDICARE

## 2024-01-22 NOTE — TELEPHONE ENCOUNTER
Detailed Voicemail message left at primary contact, with request for patient to contact Interventional Radiology Department and acknowledge understanding of pre-procedure instructions that provided in anticipation of procedure scheduled 1/29/2024.      IR contact number provided in [facility].    Etelvina RICHARDS  Interventional Radiology RN   [phone]

## 2024-01-23 ENCOUNTER — ANCILLARY PROCEDURE (OUTPATIENT)
Dept: NEUROLOGY | Facility: CLINIC | Age: 64
End: 2024-01-23
Attending: PSYCHIATRY & NEUROLOGY
Payer: MEDICARE

## 2024-01-23 DIAGNOSIS — G30.9 MAJOR NEUROCOGNITIVE DISORDER DUE TO ALZHEIMER'S DISEASE, WITH BEHAVIORAL DISTURBANCE (H): ICD-10-CM

## 2024-01-23 DIAGNOSIS — A81.00: ICD-10-CM

## 2024-01-23 DIAGNOSIS — F02.818 MAJOR NEUROCOGNITIVE DISORDER DUE TO ALZHEIMER'S DISEASE, WITH BEHAVIORAL DISTURBANCE (H): ICD-10-CM

## 2024-01-23 PROCEDURE — 95816 EEG AWAKE AND DROWSY: CPT | Performed by: PSYCHIATRY & NEUROLOGY

## 2024-01-29 ENCOUNTER — HOSPITAL ENCOUNTER (OUTPATIENT)
Dept: RADIOLOGY | Facility: HOSPITAL | Age: 64
Discharge: HOME | End: 2024-01-29
Attending: PSYCHIATRY & NEUROLOGY
Payer: MEDICARE

## 2024-01-29 PROCEDURE — 62328 DX LMBR SPI PNXR W/FLUOR/CT: CPT

## 2024-02-12 PROBLEM — R53.83 FATIGUE: Status: RESOLVED | Noted: 2023-09-18 | Resolved: 2024-02-12

## 2024-02-13 ENCOUNTER — OFFICE VISIT (OUTPATIENT)
Dept: NEUROLOGY | Facility: CLINIC | Age: 64
End: 2024-02-13
Payer: MEDICARE

## 2024-02-13 VITALS
HEART RATE: 59 BPM | BODY MASS INDEX: 21.67 KG/M2 | SYSTOLIC BLOOD PRESSURE: 101 MMHG | DIASTOLIC BLOOD PRESSURE: 51 MMHG | WEIGHT: 160 LBS | HEIGHT: 72 IN

## 2024-02-13 DIAGNOSIS — G30.9 MAJOR NEUROCOGNITIVE DISORDER DUE TO ALZHEIMER'S DISEASE, WITH BEHAVIORAL DISTURBANCE (H): Primary | ICD-10-CM

## 2024-02-13 DIAGNOSIS — F02.818 MAJOR NEUROCOGNITIVE DISORDER DUE TO ALZHEIMER'S DISEASE, WITH BEHAVIORAL DISTURBANCE (H): Primary | ICD-10-CM

## 2024-02-13 PROCEDURE — G2211 COMPLEX E/M VISIT ADD ON: HCPCS | Performed by: PSYCHIATRY & NEUROLOGY

## 2024-02-13 PROCEDURE — 99215 OFFICE O/P EST HI 40 MIN: CPT | Performed by: PSYCHIATRY & NEUROLOGY
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
## 2024-02-13 NOTE — NURSING NOTE
Chief Complaint   Patient presents with    Major neurocognitive disorder; R/O Creutzfeldt-Cecil disease     Test results and next steps      Viky Mei CMA on 2/13/2024 at 12:45 PM  [facility]
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
## 2024-02-21 ENCOUNTER — MYC MEDICAL ADVICE (OUTPATIENT)
Dept: NEUROLOGY | Facility: CLINIC | Age: 64
End: 2024-02-21
Payer: MEDICARE

## 2024-02-21 DIAGNOSIS — R41.0 DELIRIUM: ICD-10-CM

## 2024-02-23 RX ORDER — QUETIAPINE FUMARATE 50 MG/1
50 TABLET, FILM COATED ORAL 3 TIMES DAILY PRN
Qty: 30 TABLET | Refills: 3 | Status: SHIPPED | OUTPATIENT
Start: 2024-02-23 | End: 2024-07-16

## 2024-02-23 NOTE — TELEPHONE ENCOUNTER
Rx sent as requested to preferred pharmacy (see [facility]).    Signed Prescriptions:                        Disp   Refills    QUEtiapine (SEROQUEL) 50 MG tablet         30 tab*3        Sig: Take 1 tablet (50 mg) by mouth 3 times daily as           needed (agitation)  Authorizing Provider: SANDOR ANDRE  Ordering User: CJ CADET RN, BSN  [facility]

## 2024-03-10 ENCOUNTER — HEALTH MAINTENANCE LETTER (OUTPATIENT)
Age: 64
End: 2024-03-10
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
## 2024-06-12 ENCOUNTER — OFFICE VISIT (OUTPATIENT)
Dept: NEUROLOGY | Facility: CLINIC | Age: 64
End: 2024-06-12
Attending: PSYCHIATRY & NEUROLOGY
Payer: MEDICARE

## 2024-06-12 VITALS — HEART RATE: 51 BPM | TEMPERATURE: 98.2 F | SYSTOLIC BLOOD PRESSURE: 106 MMHG | DIASTOLIC BLOOD PRESSURE: 68 MMHG

## 2024-06-12 DIAGNOSIS — G30.0 MODERATE EARLY ONSET ALZHEIMER'S DEMENTIA WITH OTHER BEHAVIORAL DISTURBANCE (H): Primary | ICD-10-CM

## 2024-06-12 DIAGNOSIS — F02.818 MAJOR NEUROCOGNITIVE DISORDER DUE TO ALZHEIMER'S DISEASE, WITH BEHAVIORAL DISTURBANCE (H): ICD-10-CM

## 2024-06-12 DIAGNOSIS — G30.9 MAJOR NEUROCOGNITIVE DISORDER DUE TO ALZHEIMER'S DISEASE, WITH BEHAVIORAL DISTURBANCE (H): ICD-10-CM

## 2024-06-12 DIAGNOSIS — F02.B18 MODERATE EARLY ONSET ALZHEIMER'S DEMENTIA WITH OTHER BEHAVIORAL DISTURBANCE (H): Primary | ICD-10-CM

## 2024-06-12 RX ORDER — ESCITALOPRAM OXALATE 10 MG/1
10 TABLET ORAL DAILY
Qty: 90 TABLET | Refills: 3 | Status: SHIPPED | OUTPATIENT
Start: 2024-06-12
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
## 2024-06-12 NOTE — PATIENT INSTRUCTIONS
#Posterior cortical atrophy due to Alzheimer's disease and/or Lewy body disease  #Early-onset Alzheimer's disease  #Agitation  #Occasional visual and auditory hallucinations and delusions      - Increase Lexapro to 10 mg daily  - [employer] (helpful to hear from experts in dementia talk about all aspects of living with dementia)  - Recommend reading The 36-Hour Day: A Family Guide to Caring for People Who Have Alzheimer Disease, Other Dementias, and Memory Loss  - It would be helpful to get setup with a  to help determine next steps for care giving and legal needs. We lost our . Social workers can be found sometimes with your primary care doctor, with the Alzheimer's Association (although these social workers tend to be less hands-on) or [employer].  - Unfortunately new Alzheimer's disease drugs that target amyloid protein will not work because Rob is in a too advanced stage of disease.  - Absolutely no driving  - [employer] Phone for easy-to-use phone for people with dementia  [email or web address]/solutions/memory-cellphone/  - Follow-up in 2 weeks for time to ask additional questions

## 2024-07-15 DIAGNOSIS — R41.0 DELIRIUM: ICD-10-CM

## 2024-07-16 RX ORDER — QUETIAPINE FUMARATE 50 MG/1
50 TABLET, FILM COATED ORAL 3 TIMES DAILY PRN
Qty: 30 TABLET | Refills: 0 | Status: SHIPPED | OUTPATIENT
Start: 2024-07-16 | End: 2024-08-20

## 2024-07-16 NOTE — TELEPHONE ENCOUNTER
Refill request for: QUEtiapine (SEROQUEL) 50 MG tablet    Directions: Take 1 tablet (50 mg) by mouth 3 times daily as needed (agitation)     LOV: 02/13/24  NOV: Due for follow up August 2024.    30 day supply with 0 refills Medication T'd for review and signature    Anabela Crawford LPN on 7/16/2024 at 2:07 PM

## 2024-08-18 DIAGNOSIS — R41.0 DELIRIUM: ICD-10-CM

## 2024-08-18 NOTE — LETTER
8/18/2024      Rob Valderrama  [address] [place] [address]            Dear Rob,           We recently provided you with medication refills.  Many medications require routine follow-up with your doctor. As our neurologists are booking out several (6+) months, please contact us at your earliest convenience at [phone] to avoid any interruptions in your medication refills.     Your prescription(s) have been refilled for 30 days so you may have time for the above noted follow-up. Please call to schedule soon so we can assure you have an appointment before your next refills are needed. If you have already made a follow up appointment, please disregard this letter.             Sincerely,  [facility]   Dr. Watson's Care Team

## 2024-08-20 RX ORDER — QUETIAPINE FUMARATE 50 MG/1
TABLET, FILM COATED ORAL
Qty: 30 TABLET | Refills: 0 | Status: SHIPPED | OUTPATIENT
Start: 2024-08-20 | End: 2024-08-28

## 2024-08-20 NOTE — TELEPHONE ENCOUNTER
Refill request for: quetiapine 50mg  Directions: Take 1 tablet (50 mg) by mouth 3 times daily as needed (agitation)     LOV: 02/13/24  NOV: No future appt scheduled. Letter mailed to pt to remind to schedule an appt.    30 day supply with 0 refills Medication T'd for review and signature    Anabela Crawford LPN on 8/20/2024 at 12:43 PM

## 2024-08-26 DIAGNOSIS — R41.0 DELIRIUM: ICD-10-CM

## 2024-08-28 RX ORDER — QUETIAPINE FUMARATE 50 MG/1
TABLET, FILM COATED ORAL
Qty: 30 TABLET | Refills: 1 | Status: SHIPPED | OUTPATIENT
Start: 2024-08-28

## 2024-08-28 NOTE — TELEPHONE ENCOUNTER
Refill request for: quetiapine 50mg  Directions: Take 1 tablet (50 mg) by mouth 3 times daily as needed (agitation)      LOV: 02/13/24  NOV: 10/8/24     30 day supply with 1 refills Medication T'd for review and signature  Viky Mei CMA on 8/28/2024 at 10:07 AM  [facility]

## 2024-10-08 ENCOUNTER — OFFICE VISIT (OUTPATIENT)
Dept: NEUROLOGY | Facility: CLINIC | Age: 64
End: 2024-10-08
Payer: MEDICARE

## 2024-10-08 ENCOUNTER — LAB (OUTPATIENT)
Dept: LAB | Facility: HOSPITAL | Age: 64
End: 2024-10-08
Payer: MEDICARE

## 2024-10-08 VITALS
DIASTOLIC BLOOD PRESSURE: 58 MMHG | SYSTOLIC BLOOD PRESSURE: 110 MMHG | WEIGHT: 158 LBS | HEART RATE: 58 BPM | BODY MASS INDEX: 21.4 KG/M2 | HEIGHT: 72 IN

## 2024-10-08 DIAGNOSIS — G30.9 MAJOR NEUROCOGNITIVE DISORDER DUE TO ALZHEIMER'S DISEASE, WITH BEHAVIORAL DISTURBANCE (H): Primary | ICD-10-CM

## 2024-10-08 DIAGNOSIS — F02.818 MAJOR NEUROCOGNITIVE DISORDER DUE TO ALZHEIMER'S DISEASE, WITH BEHAVIORAL DISTURBANCE (H): ICD-10-CM

## 2024-10-08 DIAGNOSIS — G30.9 MAJOR NEUROCOGNITIVE DISORDER DUE TO ALZHEIMER'S DISEASE, WITH BEHAVIORAL DISTURBANCE (H): ICD-10-CM

## 2024-10-08 DIAGNOSIS — F02.818 MAJOR NEUROCOGNITIVE DISORDER DUE TO ALZHEIMER'S DISEASE, WITH BEHAVIORAL DISTURBANCE (H): Primary | ICD-10-CM

## 2024-10-08 DIAGNOSIS — R41.0 DELIRIUM: ICD-10-CM

## 2024-10-08 LAB
ALBUMIN SERPL BCG-MCNC: 4.6 G/DL (ref 3.5–5.2)
ALP SERPL-CCNC: 113 U/L (ref 40–150)
ALT SERPL W P-5'-P-CCNC: 18 U/L (ref 0–70)
AST SERPL W P-5'-P-CCNC: 21 U/L (ref 0–45)
BILIRUB DIRECT SERPL-MCNC: <0.2 MG/DL (ref 0–0.3)
BILIRUB SERPL-MCNC: 0.4 MG/DL
PROT SERPL-MCNC: 7 G/DL (ref 6.4–8.3)

## 2024-10-08 PROCEDURE — 82040 ASSAY OF SERUM ALBUMIN: CPT

## 2024-10-08 PROCEDURE — G2211 COMPLEX E/M VISIT ADD ON: HCPCS | Performed by: PSYCHIATRY & NEUROLOGY

## 2024-10-08 PROCEDURE — 36415 COLL VENOUS BLD VENIPUNCTURE: CPT

## 2024-10-08 PROCEDURE — 99215 OFFICE O/P EST HI 40 MIN: CPT | Performed by: PSYCHIATRY & NEUROLOGY

## 2024-10-08 RX ORDER — MULTIVIT WITH MINERALS/LUTEIN
1000 TABLET ORAL 2 TIMES DAILY
Qty: 180 CAPSULE | Refills: 3 | Status: SHIPPED | OUTPATIENT
Start: 2024-10-08

## 2024-10-08 RX ORDER — QUETIAPINE FUMARATE 25 MG/1
25 TABLET, FILM COATED ORAL AT BEDTIME
Qty: 90 TABLET | Refills: 3 | Status: SHIPPED | OUTPATIENT
Start: 2024-10-08

## 2024-10-08 RX ORDER — MEMANTINE HYDROCHLORIDE 5 MG/1
TABLET ORAL
Qty: 70 TABLET | Refills: 0 | Status: SHIPPED | OUTPATIENT
Start: 2024-10-08 | End: 2024-11-07

## 2024-10-08 RX ORDER — MEMANTINE HYDROCHLORIDE 10 MG/1
10 TABLET ORAL 2 TIMES DAILY
Qty: 180 TABLET | Refills: 3 | Status: SHIPPED | OUTPATIENT
Start: 2024-11-08

## 2024-10-08 RX ORDER — DONEPEZIL HYDROCHLORIDE 10 MG/1
10 TABLET, FILM COATED ORAL AT BEDTIME
Qty: 90 TABLET | Refills: 3 | Status: SHIPPED | OUTPATIENT
Start: 2024-10-08

## 2024-10-08 RX ORDER — LORAZEPAM 1 MG/1
TABLET ORAL
Qty: 1 TABLET | Refills: 0 | Status: SHIPPED | OUTPATIENT
Start: 2024-10-08

## 2024-10-08 NOTE — PATIENT INSTRUCTIONS
START with memantine 5mg   Morning Evening   Week 1   1 tab    Week 2  1 tab AM 1 tab PM   Week 3  2 tab AM 1 tabs PM   Week 4  2 tabs AM 2 tabs PM     Week 5 and ongoing therapy will be memantine 10mg by mouth twice a day  A prescription will automatically send to your pharmacy 30 days from your office visit
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
## 2024-10-08 NOTE — NURSING NOTE
Chief Complaint   Patient presents with    Major neurocognitive disorder; R/O Creutzfeldt-Cecil disease     Follow up - concerns about possible mini strokes      Viky Mei CMA on 10/8/2024 at 11:57 AM  [facility]

## 2024-10-31 ENCOUNTER — HOSPITAL ENCOUNTER (OUTPATIENT)
Dept: MRI IMAGING | Facility: HOSPITAL | Age: 64
Discharge: HOME | End: 2024-10-31
Attending: PSYCHIATRY & NEUROLOGY | Admitting: PSYCHIATRY & NEUROLOGY
Payer: MEDICARE

## 2024-10-31 DIAGNOSIS — F02.818 MAJOR NEUROCOGNITIVE DISORDER DUE TO ALZHEIMER'S DISEASE, WITH BEHAVIORAL DISTURBANCE (H): ICD-10-CM

## 2024-10-31 DIAGNOSIS — G30.9 MAJOR NEUROCOGNITIVE DISORDER DUE TO ALZHEIMER'S DISEASE, WITH BEHAVIORAL DISTURBANCE (H): ICD-10-CM

## 2024-10-31 PROCEDURE — A9585 GADOBUTROL INJECTION: HCPCS | Performed by: PSYCHIATRY & NEUROLOGY

## 2024-10-31 PROCEDURE — 70553 MRI BRAIN STEM W/O & W/DYE: CPT | Mod: MG

## 2024-10-31 PROCEDURE — 255N000002 HC RX 255 OP 636: Performed by: PSYCHIATRY & NEUROLOGY

## 2024-10-31 RX ORDER — GADOBUTROL 604.72 MG/ML
0.1 INJECTION INTRAVENOUS ONCE
Status: COMPLETED | OUTPATIENT
Start: 2024-10-31 | End: 2024-10-31

## 2024-10-31 RX ADMIN — GADOBUTROL 7 ML: 604.72 INJECTION INTRAVENOUS at 11:06

## 2025-03-16 ENCOUNTER — HEALTH MAINTENANCE LETTER (OUTPATIENT)
Age: 65
End: 2025-03-16
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
## 2025-04-28 ENCOUNTER — OFFICE VISIT (OUTPATIENT)
Dept: NEUROLOGY | Facility: CLINIC | Age: 65
End: 2025-04-28
Payer: MEDICARE

## 2025-04-28 VITALS
SYSTOLIC BLOOD PRESSURE: 108 MMHG | WEIGHT: 158 LBS | DIASTOLIC BLOOD PRESSURE: 62 MMHG | BODY MASS INDEX: 21.43 KG/M2 | HEART RATE: 58 BPM

## 2025-04-28 DIAGNOSIS — R41.0 DELIRIUM: ICD-10-CM

## 2025-04-28 DIAGNOSIS — G30.9 MAJOR NEUROCOGNITIVE DISORDER DUE TO ALZHEIMER'S DISEASE, WITH BEHAVIORAL DISTURBANCE (H): Primary | ICD-10-CM

## 2025-04-28 DIAGNOSIS — F02.818 MAJOR NEUROCOGNITIVE DISORDER DUE TO ALZHEIMER'S DISEASE, WITH BEHAVIORAL DISTURBANCE (H): Primary | ICD-10-CM

## 2025-04-28 PROCEDURE — 99214 OFFICE O/P EST MOD 30 MIN: CPT | Performed by: PSYCHIATRY & NEUROLOGY

## 2025-04-28 PROCEDURE — G2211 COMPLEX E/M VISIT ADD ON: HCPCS | Performed by: PSYCHIATRY & NEUROLOGY

## 2025-04-28 PROCEDURE — 3078F DIAST BP <80 MM HG: CPT | Performed by: PSYCHIATRY & NEUROLOGY

## 2025-04-28 PROCEDURE — 3074F SYST BP LT 130 MM HG: CPT | Performed by: PSYCHIATRY & NEUROLOGY

## 2025-04-28 RX ORDER — MEMANTINE HYDROCHLORIDE 21 MG/1
21 CAPSULE, EXTENDED RELEASE ORAL DAILY
Qty: 30 CAPSULE | Refills: 11 | Status: SHIPPED | OUTPATIENT
Start: 2025-04-28

## 2025-04-28 RX ORDER — DONEPEZIL HYDROCHLORIDE 10 MG/1
10 TABLET, FILM COATED ORAL AT BEDTIME
Qty: 90 TABLET | Refills: 3 | Status: SHIPPED | OUTPATIENT
Start: 2025-04-28

## 2025-04-28 RX ORDER — QUETIAPINE FUMARATE 25 MG/1
50 TABLET, FILM COATED ORAL AT BEDTIME
COMMUNITY
Start: 2025-04-28

## 2025-04-28 RX ORDER — MULTIVIT WITH MINERALS/LUTEIN
1000 TABLET ORAL 2 TIMES DAILY
Qty: 180 CAPSULE | Refills: 3 | Status: SHIPPED | OUTPATIENT
Start: 2025-04-28
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
## 2025-05-07 ENCOUNTER — TRANSFERRED RECORDS (OUTPATIENT)
Dept: HEALTH INFORMATION MANAGEMENT | Facility: CLINIC | Age: 65
End: 2025-05-07
Payer: MEDICARE